# Patient Record
Sex: MALE | Race: WHITE | Employment: OTHER | URBAN - METROPOLITAN AREA
[De-identification: names, ages, dates, MRNs, and addresses within clinical notes are randomized per-mention and may not be internally consistent; named-entity substitution may affect disease eponyms.]

---

## 2023-01-23 ENCOUNTER — HOSPITAL ENCOUNTER (OUTPATIENT)
Dept: PREADMISSION TESTING | Age: 68
Discharge: HOME | End: 2023-01-23
Payer: MEDICARE

## 2023-01-23 ENCOUNTER — TRANSCRIBE ORDER (OUTPATIENT)
Dept: REGISTRATION | Age: 68
End: 2023-01-23

## 2023-01-23 DIAGNOSIS — M16.11 PRIMARY OSTEOARTHRITIS OF RIGHT HIP: Primary | ICD-10-CM

## 2023-01-23 DIAGNOSIS — M16.11 PRIMARY OSTEOARTHRITIS OF RIGHT HIP: ICD-10-CM

## 2023-01-23 LAB
APPEARANCE UR: CLEAR
APTT PPP: 26.3 SEC (ref 23–36.4)
ATRIAL RATE: 61 BPM
BILIRUB UR QL: NEGATIVE
CALCULATED P AXIS, ECG09: 54 DEGREES
CALCULATED R AXIS, ECG10: 82 DEGREES
CALCULATED T AXIS, ECG11: 26 DEGREES
COLOR UR: YELLOW
DIAGNOSIS, 93000: NORMAL
GLUCOSE UR STRIP.AUTO-MCNC: NEGATIVE MG/DL
HGB UR QL STRIP: NEGATIVE
INR PPP: 1 (ref 0.8–1.2)
KETONES UR QL STRIP.AUTO: NEGATIVE MG/DL
LEUKOCYTE ESTERASE UR QL STRIP.AUTO: NEGATIVE
NITRITE UR QL STRIP.AUTO: NEGATIVE
P-R INTERVAL, ECG05: 166 MS
PH UR STRIP: 5.5 (ref 5–8)
PROT UR STRIP-MCNC: NEGATIVE MG/DL
PROTHROMBIN TIME: 13.7 SEC (ref 11.5–15.2)
Q-T INTERVAL, ECG07: 380 MS
QRS DURATION, ECG06: 84 MS
QTC CALCULATION (BEZET), ECG08: 382 MS
SP GR UR REFRACTOMETRY: 1.01 (ref 1–1.03)
UROBILINOGEN UR QL STRIP.AUTO: 0.2 EU/DL (ref 0.2–1)
VENTRICULAR RATE, ECG03: 61 BPM

## 2023-01-23 PROCEDURE — 93005 ELECTROCARDIOGRAM TRACING: CPT

## 2023-01-23 PROCEDURE — 36415 COLL VENOUS BLD VENIPUNCTURE: CPT

## 2023-01-23 PROCEDURE — 85610 PROTHROMBIN TIME: CPT

## 2023-01-23 PROCEDURE — 87086 URINE CULTURE/COLONY COUNT: CPT

## 2023-01-23 PROCEDURE — 85730 THROMBOPLASTIN TIME PARTIAL: CPT

## 2023-01-23 PROCEDURE — 81003 URINALYSIS AUTO W/O SCOPE: CPT

## 2023-01-24 LAB
BACTERIA SPEC CULT: NORMAL
SERVICE CMNT-IMP: NORMAL
SERVICE CMNT-IMP: NORMAL

## 2023-02-02 ENCOUNTER — HOSPITAL ENCOUNTER (OUTPATIENT)
Dept: PREADMISSION TESTING | Age: 68
Discharge: HOME | End: 2023-02-02

## 2023-02-02 VITALS — WEIGHT: 250 LBS | BODY MASS INDEX: 40.18 KG/M2 | HEIGHT: 66 IN

## 2023-02-02 RX ORDER — LANOLIN ALCOHOL/MO/W.PET/CERES
2000 CREAM (GRAM) TOPICAL EVERY OTHER DAY
COMMUNITY

## 2023-02-02 RX ORDER — AMLODIPINE BESYLATE 10 MG/1
10 TABLET ORAL
COMMUNITY

## 2023-02-02 RX ORDER — LOPERAMIDE HYDROCHLORIDE 2 MG/1
2 CAPSULE ORAL
COMMUNITY

## 2023-02-02 RX ORDER — VALACYCLOVIR HYDROCHLORIDE 500 MG/1
500 TABLET, FILM COATED ORAL
COMMUNITY

## 2023-02-02 RX ORDER — ASCORBIC ACID 500 MG
1000 TABLET ORAL DAILY
COMMUNITY

## 2023-02-02 RX ORDER — LOSARTAN POTASSIUM 100 MG/1
100 TABLET ORAL
COMMUNITY

## 2023-02-02 RX ORDER — DORZOLAMIDE HYDROCHLORIDE AND TIMOLOL MALEATE PRESERVATIVE FREE 20; 5 MG/ML; MG/ML
1 SOLUTION/ DROPS OPHTHALMIC 2 TIMES DAILY
COMMUNITY

## 2023-02-02 RX ORDER — SODIUM CHLORIDE, SODIUM LACTATE, POTASSIUM CHLORIDE, CALCIUM CHLORIDE 600; 310; 30; 20 MG/100ML; MG/100ML; MG/100ML; MG/100ML
125 INJECTION, SOLUTION INTRAVENOUS CONTINUOUS
Status: CANCELLED | OUTPATIENT
Start: 2023-02-02

## 2023-02-02 RX ORDER — CEFAZOLIN SODIUM/WATER 2 G/20 ML
2 SYRINGE (ML) INTRAVENOUS ONCE
Status: CANCELLED | OUTPATIENT
Start: 2023-02-02 | End: 2023-02-02

## 2023-02-02 RX ORDER — CHOLECALCIFEROL TAB 125 MCG (5000 UNIT) 125 MCG
5000 TAB ORAL DAILY
COMMUNITY

## 2023-02-02 RX ORDER — MULTIVITAMIN
1 TABLET ORAL DAILY
COMMUNITY

## 2023-02-02 RX ORDER — METOPROLOL SUCCINATE 25 MG/1
25 TABLET, EXTENDED RELEASE ORAL
COMMUNITY

## 2023-02-02 NOTE — PERIOP NOTES
Operative consent filled out according to MD order on surgical posting, verbatim. Patient states \"no implanted devices\"   Patient instructed as part of pre-op teaching not to bring any valuables including Wallet, jewelry, cell phone, lap top or tablet. Patient also instructed not to wear anything on skin including deodorant, cologne, creams or sprays. Patient confirmed receipt of CHG skin preparation and instructions.

## 2023-02-09 ENCOUNTER — ANESTHESIA EVENT (OUTPATIENT)
Facility: HOSPITAL | Age: 68
End: 2023-02-09
Payer: MEDICARE

## 2023-02-09 RX ORDER — M-VIT,TX,IRON,MINS/CALC/FOLIC 27MG-0.4MG
1 TABLET ORAL DAILY
COMMUNITY

## 2023-02-09 RX ORDER — AMLODIPINE BESYLATE 10 MG/1
10 TABLET ORAL DAILY
COMMUNITY

## 2023-02-09 RX ORDER — CHLORHEXIDINE GLUCONATE 4 G/100ML
SOLUTION TOPICAL DAILY PRN
Status: CANCELLED | OUTPATIENT
Start: 2023-02-13

## 2023-02-09 RX ORDER — LANOLIN ALCOHOL/MO/W.PET/CERES
2000 CREAM (GRAM) TOPICAL EVERY OTHER DAY
COMMUNITY

## 2023-02-09 RX ORDER — LOPERAMIDE HYDROCHLORIDE 2 MG/1
2 CAPSULE ORAL 4 TIMES DAILY PRN
COMMUNITY

## 2023-02-09 RX ORDER — ASCORBIC ACID 500 MG
1000 TABLET ORAL DAILY
COMMUNITY

## 2023-02-09 RX ORDER — VALACYCLOVIR HYDROCHLORIDE 500 MG/1
500 TABLET, FILM COATED ORAL AS NEEDED
Status: ON HOLD | COMMUNITY
End: 2023-02-13 | Stop reason: HOSPADM

## 2023-02-09 RX ORDER — METOPROLOL SUCCINATE 25 MG/1
25 TABLET, EXTENDED RELEASE ORAL DAILY
COMMUNITY

## 2023-02-09 RX ORDER — LOSARTAN POTASSIUM 100 MG/1
100 TABLET ORAL NIGHTLY
COMMUNITY

## 2023-02-09 RX ORDER — DORZOLAMIDE HYDROCHLORIDE AND TIMOLOL MALEATE 20; 5 MG/ML; MG/ML
1 SOLUTION/ DROPS OPHTHALMIC 2 TIMES DAILY
COMMUNITY

## 2023-02-09 NOTE — PERIOP NOTE
Entered Epic to transfer ht/wt, allergies, home medications, medical history and orders from PAT phone appointment completed on 2/9/2023in [facility].

## 2023-02-13 ENCOUNTER — HOSPITAL ENCOUNTER (OUTPATIENT)
Facility: HOSPITAL | Age: 68
Setting detail: OUTPATIENT SURGERY
Discharge: HOME | End: 2023-02-13
Attending: ORTHOPAEDIC SURGERY | Admitting: ORTHOPAEDIC SURGERY
Payer: MEDICARE

## 2023-02-13 ENCOUNTER — ANESTHESIA (OUTPATIENT)
Facility: HOSPITAL | Age: 68
End: 2023-02-13
Payer: MEDICARE

## 2023-02-13 ENCOUNTER — APPOINTMENT (OUTPATIENT)
Facility: HOSPITAL | Age: 68
End: 2023-02-13
Attending: ORTHOPAEDIC SURGERY
Payer: MEDICARE

## 2023-02-13 VITALS
TEMPERATURE: 97 F | HEIGHT: 66 IN | SYSTOLIC BLOOD PRESSURE: 121 MMHG | HEART RATE: 67 BPM | WEIGHT: 250 LBS | RESPIRATION RATE: 12 BRPM | BODY MASS INDEX: 40.18 KG/M2 | DIASTOLIC BLOOD PRESSURE: 62 MMHG | OXYGEN SATURATION: 99 %

## 2023-02-13 DIAGNOSIS — Z96.641 S/P HIP REPLACEMENT, RIGHT: Primary | ICD-10-CM

## 2023-02-13 LAB
ABO + RH BLD: NORMAL
BLOOD GROUP ANTIBODIES SERPL: NORMAL
GLUCOSE BLD STRIP.AUTO-MCNC: 121 MG/DL (ref 70–110)
GLUCOSE BLD STRIP.AUTO-MCNC: 185 MG/DL (ref 70–110)
SPECIMEN EXP DATE BLD: NORMAL

## 2023-02-13 PROCEDURE — 2709999900 HC NON-CHARGEABLE SUPPLY: Performed by: ORTHOPAEDIC SURGERY

## 2023-02-13 PROCEDURE — 86901 BLOOD TYPING SEROLOGIC RH(D): CPT

## 2023-02-13 PROCEDURE — 6370000000 HC RX 637 (ALT 250 FOR IP): Performed by: ORTHOPAEDIC SURGERY

## 2023-02-13 PROCEDURE — 7100000001 HC PACU RECOVERY - ADDTL 15 MIN: Performed by: ORTHOPAEDIC SURGERY

## 2023-02-13 PROCEDURE — 6360000002 HC RX W HCPCS: Performed by: NURSE ANESTHETIST, CERTIFIED REGISTERED

## 2023-02-13 PROCEDURE — 6370000000 HC RX 637 (ALT 250 FOR IP): Performed by: ANESTHESIOLOGY

## 2023-02-13 PROCEDURE — 97161 PT EVAL LOW COMPLEX 20 MIN: CPT

## 2023-02-13 PROCEDURE — 3600000005 HC SURGERY LEVEL 5 BASE: Performed by: ORTHOPAEDIC SURGERY

## 2023-02-13 PROCEDURE — 6360000002 HC RX W HCPCS: Performed by: ORTHOPAEDIC SURGERY

## 2023-02-13 PROCEDURE — 97116 GAIT TRAINING THERAPY: CPT

## 2023-02-13 PROCEDURE — 3700000001 HC ADD 15 MINUTES (ANESTHESIA): Performed by: ORTHOPAEDIC SURGERY

## 2023-02-13 PROCEDURE — 2720000010 HC SURG SUPPLY STERILE: Performed by: ORTHOPAEDIC SURGERY

## 2023-02-13 PROCEDURE — 2500000003 HC RX 250 WO HCPCS: Performed by: PHYSICIAN ASSISTANT

## 2023-02-13 PROCEDURE — 7100000011 HC PHASE II RECOVERY - ADDTL 15 MIN: Performed by: ORTHOPAEDIC SURGERY

## 2023-02-13 PROCEDURE — 2500000003 HC RX 250 WO HCPCS: Performed by: ORTHOPAEDIC SURGERY

## 2023-02-13 PROCEDURE — 7100000000 HC PACU RECOVERY - FIRST 15 MIN: Performed by: ORTHOPAEDIC SURGERY

## 2023-02-13 PROCEDURE — 3700000000 HC ANESTHESIA ATTENDED CARE: Performed by: ORTHOPAEDIC SURGERY

## 2023-02-13 PROCEDURE — 3600000015 HC SURGERY LEVEL 5 ADDTL 15MIN: Performed by: ORTHOPAEDIC SURGERY

## 2023-02-13 PROCEDURE — 2580000003 HC RX 258: Performed by: ORTHOPAEDIC SURGERY

## 2023-02-13 PROCEDURE — 7100000010 HC PHASE II RECOVERY - FIRST 15 MIN: Performed by: ORTHOPAEDIC SURGERY

## 2023-02-13 PROCEDURE — 82962 GLUCOSE BLOOD TEST: CPT

## 2023-02-13 PROCEDURE — 6360000002 HC RX W HCPCS: Performed by: ANESTHESIOLOGY

## 2023-02-13 PROCEDURE — C1776 JOINT DEVICE (IMPLANTABLE): HCPCS | Performed by: ORTHOPAEDIC SURGERY

## 2023-02-13 PROCEDURE — 73501 X-RAY EXAM HIP UNI 1 VIEW: CPT

## 2023-02-13 PROCEDURE — 97535 SELF CARE MNGMENT TRAINING: CPT

## 2023-02-13 PROCEDURE — 97165 OT EVAL LOW COMPLEX 30 MIN: CPT

## 2023-02-13 PROCEDURE — 36415 COLL VENOUS BLD VENIPUNCTURE: CPT

## 2023-02-13 PROCEDURE — 2500000003 HC RX 250 WO HCPCS: Performed by: NURSE ANESTHETIST, CERTIFIED REGISTERED

## 2023-02-13 DEVICE — TRIDENT II TRITANIUM CLUSTER 58F
Type: IMPLANTABLE DEVICE | Site: HIP | Status: FUNCTIONAL
Brand: TRIDENT II

## 2023-02-13 DEVICE — CERAMIC V40 FEMORAL HEAD
Type: IMPLANTABLE DEVICE | Site: HIP | Status: FUNCTIONAL
Brand: BIOLOX

## 2023-02-13 DEVICE — TRIDENT X3 0 DEGREE POLYETHYLENE INSERT
Type: IMPLANTABLE DEVICE | Site: HIP | Status: FUNCTIONAL
Brand: TRIDENT X3 INSERT

## 2023-02-13 DEVICE — COMPONENT TOT HIP CAPPED LNR POLYETH H2STRYKER] STRYKER CORP]: Type: IMPLANTABLE DEVICE | Site: HIP | Status: FUNCTIONAL

## 2023-02-13 DEVICE — HIP STEM - HIGH OFFSET
Type: IMPLANTABLE DEVICE | Site: HIP | Status: FUNCTIONAL
Brand: INSIGNIA

## 2023-02-13 DEVICE — SCREW BNE L25MM DIA6.5MM HEX LO PROF TRIDENT II: Type: IMPLANTABLE DEVICE | Site: HIP | Status: FUNCTIONAL

## 2023-02-13 RX ORDER — HYDRALAZINE HYDROCHLORIDE 20 MG/ML
10 INJECTION INTRAMUSCULAR; INTRAVENOUS
Status: DISCONTINUED | OUTPATIENT
Start: 2023-02-13 | End: 2023-02-13 | Stop reason: HOSPADM

## 2023-02-13 RX ORDER — ROCURONIUM BROMIDE 10 MG/ML
INJECTION, SOLUTION INTRAVENOUS PRN
Status: DISCONTINUED | OUTPATIENT
Start: 2023-02-13 | End: 2023-02-13 | Stop reason: SDUPTHER

## 2023-02-13 RX ORDER — SODIUM CHLORIDE 9 MG/ML
INJECTION, SOLUTION INTRAVENOUS PRN
Status: DISCONTINUED | OUTPATIENT
Start: 2023-02-13 | End: 2023-02-13 | Stop reason: HOSPADM

## 2023-02-13 RX ORDER — SODIUM CHLORIDE 0.9 % (FLUSH) 0.9 %
5-40 SYRINGE (ML) INJECTION EVERY 12 HOURS SCHEDULED
Status: DISCONTINUED | OUTPATIENT
Start: 2023-02-13 | End: 2023-02-13 | Stop reason: HOSPADM

## 2023-02-13 RX ORDER — MIDAZOLAM HYDROCHLORIDE 2 MG/2ML
2 INJECTION, SOLUTION INTRAMUSCULAR; INTRAVENOUS
Status: DISCONTINUED | OUTPATIENT
Start: 2023-02-13 | End: 2023-02-13 | Stop reason: HOSPADM

## 2023-02-13 RX ORDER — FENTANYL CITRATE 50 UG/ML
25 INJECTION, SOLUTION INTRAMUSCULAR; INTRAVENOUS EVERY 5 MIN PRN
Status: DISCONTINUED | OUTPATIENT
Start: 2023-02-13 | End: 2023-02-13 | Stop reason: HOSPADM

## 2023-02-13 RX ORDER — ASPIRIN 81 MG/1
81 TABLET ORAL 2 TIMES DAILY
Qty: 60 TABLET | Refills: 0 | Status: SHIPPED | OUTPATIENT
Start: 2023-02-13

## 2023-02-13 RX ORDER — OXYCODONE HYDROCHLORIDE AND ACETAMINOPHEN 5; 325 MG/1; MG/1
1 TABLET ORAL EVERY 6 HOURS PRN
Qty: 40 TABLET | Refills: 0 | OUTPATIENT
Start: 2023-02-13 | End: 2023-02-13 | Stop reason: SDUPTHER

## 2023-02-13 RX ORDER — ONDANSETRON 2 MG/ML
INJECTION INTRAMUSCULAR; INTRAVENOUS PRN
Status: DISCONTINUED | OUTPATIENT
Start: 2023-02-13 | End: 2023-02-13 | Stop reason: SDUPTHER

## 2023-02-13 RX ORDER — DIPHENHYDRAMINE HYDROCHLORIDE 50 MG/ML
12.5 INJECTION INTRAMUSCULAR; INTRAVENOUS
Status: DISCONTINUED | OUTPATIENT
Start: 2023-02-13 | End: 2023-02-13 | Stop reason: HOSPADM

## 2023-02-13 RX ORDER — CEPHALEXIN 500 MG/1
500 CAPSULE ORAL 4 TIMES DAILY
Qty: 8 CAPSULE | Refills: 0 | Status: SHIPPED | OUTPATIENT
Start: 2023-02-13 | End: 2023-02-15

## 2023-02-13 RX ORDER — LIDOCAINE HYDROCHLORIDE 20 MG/ML
INJECTION, SOLUTION EPIDURAL; INFILTRATION; INTRACAUDAL; PERINEURAL PRN
Status: DISCONTINUED | OUTPATIENT
Start: 2023-02-13 | End: 2023-02-13 | Stop reason: SDUPTHER

## 2023-02-13 RX ORDER — ACETAMINOPHEN 500 MG
1000 TABLET ORAL ONCE
Status: COMPLETED | OUTPATIENT
Start: 2023-02-13 | End: 2023-02-13

## 2023-02-13 RX ORDER — MEPERIDINE HYDROCHLORIDE 50 MG/ML
12.5 INJECTION INTRAMUSCULAR; INTRAVENOUS; SUBCUTANEOUS EVERY 5 MIN PRN
Status: DISCONTINUED | OUTPATIENT
Start: 2023-02-13 | End: 2023-02-13 | Stop reason: HOSPADM

## 2023-02-13 RX ORDER — OXYCODONE HYDROCHLORIDE 5 MG/1
5 TABLET ORAL EVERY 4 HOURS PRN
Status: DISCONTINUED | OUTPATIENT
Start: 2023-02-13 | End: 2023-02-13 | Stop reason: HOSPADM

## 2023-02-13 RX ORDER — LABETALOL HYDROCHLORIDE 5 MG/ML
10 INJECTION, SOLUTION INTRAVENOUS EVERY 10 MIN PRN
Status: DISCONTINUED | OUTPATIENT
Start: 2023-02-13 | End: 2023-02-13 | Stop reason: HOSPADM

## 2023-02-13 RX ORDER — TRANEXAMIC ACID 10 MG/ML
1000 INJECTION, SOLUTION INTRAVENOUS
Status: COMPLETED | OUTPATIENT
Start: 2023-02-13 | End: 2023-02-13

## 2023-02-13 RX ORDER — CELECOXIB 100 MG/1
200 CAPSULE ORAL ONCE
Status: COMPLETED | OUTPATIENT
Start: 2023-02-13 | End: 2023-02-13

## 2023-02-13 RX ORDER — HYDROMORPHONE HYDROCHLORIDE 1 MG/ML
0.5 INJECTION, SOLUTION INTRAMUSCULAR; INTRAVENOUS; SUBCUTANEOUS EVERY 5 MIN PRN
Status: DISCONTINUED | OUTPATIENT
Start: 2023-02-13 | End: 2023-02-13 | Stop reason: HOSPADM

## 2023-02-13 RX ORDER — SODIUM CHLORIDE, SODIUM LACTATE, POTASSIUM CHLORIDE, CALCIUM CHLORIDE 600; 310; 30; 20 MG/100ML; MG/100ML; MG/100ML; MG/100ML
INJECTION, SOLUTION INTRAVENOUS CONTINUOUS
Status: DISCONTINUED | OUTPATIENT
Start: 2023-02-13 | End: 2023-02-13 | Stop reason: HOSPADM

## 2023-02-13 RX ORDER — EPHEDRINE SULFATE/0.9% NACL/PF 50 MG/5 ML
SYRINGE (ML) INTRAVENOUS PRN
Status: DISCONTINUED | OUTPATIENT
Start: 2023-02-13 | End: 2023-02-13 | Stop reason: SDUPTHER

## 2023-02-13 RX ORDER — MIDAZOLAM HYDROCHLORIDE 1 MG/ML
INJECTION INTRAMUSCULAR; INTRAVENOUS PRN
Status: DISCONTINUED | OUTPATIENT
Start: 2023-02-13 | End: 2023-02-13 | Stop reason: SDUPTHER

## 2023-02-13 RX ORDER — INSULIN LISPRO 100 [IU]/ML
0-16 INJECTION, SOLUTION INTRAVENOUS; SUBCUTANEOUS
Status: DISCONTINUED | OUTPATIENT
Start: 2023-02-13 | End: 2023-02-13 | Stop reason: HOSPADM

## 2023-02-13 RX ORDER — KETAMINE HCL IN NACL, ISO-OSM 100MG/10ML
SYRINGE (ML) INJECTION PRN
Status: DISCONTINUED | OUTPATIENT
Start: 2023-02-13 | End: 2023-02-13 | Stop reason: SDUPTHER

## 2023-02-13 RX ORDER — SODIUM CHLORIDE 0.9 % (FLUSH) 0.9 %
5-40 SYRINGE (ML) INJECTION PRN
Status: DISCONTINUED | OUTPATIENT
Start: 2023-02-13 | End: 2023-02-13 | Stop reason: HOSPADM

## 2023-02-13 RX ORDER — ONDANSETRON 2 MG/ML
4 INJECTION INTRAMUSCULAR; INTRAVENOUS
Status: DISCONTINUED | OUTPATIENT
Start: 2023-02-13 | End: 2023-02-13 | Stop reason: HOSPADM

## 2023-02-13 RX ORDER — OXYCODONE HYDROCHLORIDE AND ACETAMINOPHEN 5; 325 MG/1; MG/1
1 TABLET ORAL EVERY 6 HOURS PRN
Qty: 40 TABLET | Refills: 0 | Status: SHIPPED | OUTPATIENT
Start: 2023-02-13 | End: 2023-02-20

## 2023-02-13 RX ORDER — PROPOFOL 10 MG/ML
INJECTION, EMULSION INTRAVENOUS PRN
Status: DISCONTINUED | OUTPATIENT
Start: 2023-02-13 | End: 2023-02-13 | Stop reason: SDUPTHER

## 2023-02-13 RX ADMIN — HYDROMORPHONE HYDROCHLORIDE 0.5 MG: 1 INJECTION, SOLUTION INTRAMUSCULAR; INTRAVENOUS; SUBCUTANEOUS at 11:20

## 2023-02-13 RX ADMIN — TRANEXAMIC ACID 1000 MG: 10 INJECTION, SOLUTION INTRAVENOUS at 10:28

## 2023-02-13 RX ADMIN — SODIUM CHLORIDE, SODIUM LACTATE, POTASSIUM CHLORIDE, AND CALCIUM CHLORIDE: 600; 310; 30; 20 INJECTION, SOLUTION INTRAVENOUS at 06:31

## 2023-02-13 RX ADMIN — Medication 20 MG: at 07:50

## 2023-02-13 RX ADMIN — ROCURONIUM BROMIDE 10 MG: 10 INJECTION, SOLUTION INTRAVENOUS at 09:28

## 2023-02-13 RX ADMIN — TRANEXAMIC ACID 1000 MG: 10 INJECTION, SOLUTION INTRAVENOUS at 08:08

## 2023-02-13 RX ADMIN — ROCURONIUM BROMIDE 5 MG: 10 INJECTION, SOLUTION INTRAVENOUS at 09:50

## 2023-02-13 RX ADMIN — HYDROMORPHONE HYDROCHLORIDE 0.5 MG: 1 INJECTION, SOLUTION INTRAMUSCULAR; INTRAVENOUS; SUBCUTANEOUS at 11:13

## 2023-02-13 RX ADMIN — ACETAMINOPHEN 1000 MG: 500 TABLET ORAL at 06:30

## 2023-02-13 RX ADMIN — ONDANSETRON HYDROCHLORIDE 4 MG: 2 INJECTION INTRAMUSCULAR; INTRAVENOUS at 10:28

## 2023-02-13 RX ADMIN — MIDAZOLAM 2 MG: 1 INJECTION INTRAMUSCULAR; INTRAVENOUS at 07:41

## 2023-02-13 RX ADMIN — Medication 10 MG: at 08:44

## 2023-02-13 RX ADMIN — Medication 10 MG: at 08:47

## 2023-02-13 RX ADMIN — CELECOXIB 200 MG: 100 CAPSULE ORAL at 06:30

## 2023-02-13 RX ADMIN — ROCURONIUM BROMIDE 10 MG: 10 INJECTION, SOLUTION INTRAVENOUS at 09:38

## 2023-02-13 RX ADMIN — LIDOCAINE HYDROCHLORIDE 80 MG: 20 INJECTION, SOLUTION EPIDURAL; INFILTRATION; INTRACAUDAL; PERINEURAL at 07:45

## 2023-02-13 RX ADMIN — HYDROMORPHONE HYDROCHLORIDE 0.25 MG: 1 INJECTION, SOLUTION INTRAMUSCULAR; INTRAVENOUS; SUBCUTANEOUS at 07:52

## 2023-02-13 RX ADMIN — Medication 2000 MG: at 07:49

## 2023-02-13 RX ADMIN — OXYCODONE HYDROCHLORIDE 5 MG: 5 TABLET ORAL at 13:46

## 2023-02-13 RX ADMIN — HYDROMORPHONE HYDROCHLORIDE 0.25 MG: 1 INJECTION, SOLUTION INTRAMUSCULAR; INTRAVENOUS; SUBCUTANEOUS at 08:14

## 2023-02-13 RX ADMIN — HYDROMORPHONE HYDROCHLORIDE 0.5 MG: 1 INJECTION, SOLUTION INTRAMUSCULAR; INTRAVENOUS; SUBCUTANEOUS at 08:21

## 2023-02-13 RX ADMIN — FENTANYL CITRATE 25 MCG: 50 INJECTION, SOLUTION INTRAMUSCULAR; INTRAVENOUS at 11:35

## 2023-02-13 RX ADMIN — PROPOFOL 150 MG: 10 INJECTION, EMULSION INTRAVENOUS at 07:45

## 2023-02-13 RX ADMIN — SODIUM CHLORIDE, SODIUM LACTATE, POTASSIUM CHLORIDE, AND CALCIUM CHLORIDE: 600; 310; 30; 20 INJECTION, SOLUTION INTRAVENOUS at 10:05

## 2023-02-13 ASSESSMENT — PAIN DESCRIPTION - LOCATION
LOCATION: HIP

## 2023-02-13 ASSESSMENT — PAIN SCALES - GENERAL
PAINLEVEL_OUTOF10: 0
PAINLEVEL_OUTOF10: 8
PAINLEVEL_OUTOF10: 6
PAINLEVEL_OUTOF10: 7
PAINLEVEL_OUTOF10: 4

## 2023-02-13 ASSESSMENT — PAIN DESCRIPTION - ORIENTATION
ORIENTATION: RIGHT

## 2023-02-13 ASSESSMENT — PAIN DESCRIPTION - DESCRIPTORS
DESCRIPTORS: DISCOMFORT;ACHING;BURNING
DESCRIPTORS: ACHING

## 2023-02-13 ASSESSMENT — PAIN - FUNCTIONAL ASSESSMENT
PAIN_FUNCTIONAL_ASSESSMENT: INTOLERABLE, UNABLE TO DO ANY ACTIVE OR PASSIVE ACTIVITIES
PAIN_FUNCTIONAL_ASSESSMENT: NONE - DENIES PAIN

## 2023-02-13 NOTE — ANESTHESIA POSTPROCEDURE EVALUATION
Department of Anesthesiology  Postprocedure Note    Patient: Lashawn Siegel   MRN: [id number]  YOB: 1955  Date of evaluation: 2/13/2023      Procedure Summary     Date: 02/13/23 Room / Location: [facility] 05 / [facility] MAIN OR    Anesthesia Start: 9326 Anesthesia Stop: 3354    Procedure: RIGHT TOTAL HIP REPLACEMENT ANTERIOR APPROACH WITH C-ARM (Right: Hip) Diagnosis:       Primary osteoarthritis of right hip      (RIGHT HIP OSTEOARTHRITIS)    Surgeons: Piper Lopez MD Responsible Provider: Angelica Almonte MD    Anesthesia Type: General ASA Status: 3          Anesthesia Type: General    Ro Phase I: Ro Score: 10    Ro Phase II:        Anesthesia Post Evaluation    Patient location during evaluation: PACU  Patient participation: complete - patient participated  Level of consciousness: awake  Pain score: 2  Airway patency: patent  Nausea & Vomiting: no nausea and no vomiting  Complications: no  Cardiovascular status: blood pressure returned to baseline  Respiratory status: acceptable  Hydration status: euvolemic

## 2023-02-13 NOTE — DISCHARGE INSTRUCTIONS
Total Hip Arthroplasty Discharge Instructions   Bro Lindsay M.D. Please take the time to review the following instructions before you leave the hospital and use them as guidelines during your recovery from surgery. If you have any questions you may contact my office at [phone]. Wound Care / Dressing Changes:     Two days after your surgery date you should remove your dressing. A big, bulky dressing isn't necessary as long as there isn't any drainage from the incisions. If there is drainage you can put a band-aid, Primapore, or Mepilex dressing over the incision and change it daily until drainage stops. No dressing is necessary if there is no drainage. It isn't necessary to apply antibiotic ointment to your incisions. Exofin tape will peel off in approximately 2-6 weeks. It does not need to be removed prior to that. When it begins to peel off you can cut the edges away with scissors. Mertha Hagan / Bathing: You may only shower. You may shower if there is no drainage from your incisions. Your dressing may be removed for showering. You may get your incisions wet in the shower. Don't vigorously scrub the area where your incisions are. Apply a clean, dry dressing after drying off the area of your incisions. Don't take a tub bath, get in a swimming pool or Jacuzzi until the incisions are completely healed. Do not soak your incisions under water. Weight Bearing Status / Braces:     Weight bearing is as tolerated. Use crutches, walker, or cane as needed for support. You may move your joints as much as tolerated. Home Health:    Home health has been arranged for skilled nursing visits and physical therapy. Your home health has been set up through____________ . If no one from the agency calls you on the day after you arrive home, please contact them at the number provided at discharge. PT, gait training and strengthening. Continue therapeutic exercises.      Physical Therapy:       Begin In-Home Physical Therapy; 3 times a week to work on gait training, range of motion, strengthening, and weight bearing exercises as tolerable. Continue to use your walker or cane when walking; may progress from the walker to a cane to complete total bearing as tolerable. Ice / Elevation:     Continue ice and elevation as needed for pain and swelling. Diet:     Resume your prehospital diet. If you have excessive nausea or vomiting call your doctor's office     Medication:   {Medication reconciliation information is now added to the patient's AVS automatically when it is printed. There is no need to use this SmartLink in discharge instructions. Highlight this text and delete it to clear this message}      1. You will be given a prescription for pain medication when you are discharged for the hospital. Take the medication as needed according to the directions on the prescription bottle. Possible side effects ofthe medication include dizziness, headache, nausea, vomiting, constipation and urinary retention. If you experience any ofthese side effects call the office so that we can assist you in relieving them. Discontinue the use ofthe pain medication if you develop itching, rash, shortness ofbreath or difficulties swallowing. If these symptoms become severe or aren't relieved by discontinuing the medication you should seek immediate medical attention. Refills of pain medication are authorized during office hours only. (8am - 5pm Mon. thru Fri.)     You may resume the medication you were taking prior to your surgery. Pain medication may change the effects of any antidepressant medication you are taking. Ifyou have any questions about possible interactions between your regular medications and the pain medication you should consult the physician who prescribes your regular medications. Other medication notes:      Blood Thinner:       You will be prescribed Aspirin 81 mg to take by mouth twice daily after meals for one month following surgery to prevent blood clots. Follow Up Appointment:   Please call [phone] for a follow appointment with Dr. Ana Martinez in 10-14 days from the time of your surgery. Please let our office know you are scheduling a post-op appointment. Signs and Symptoms to be Aware of: If any of the following signs and symptoms occur, you should contact Dr. Amarilys Kowalski office. Please be advised if a problem arises which you feel requires immediate medical attention or you are unable to contact Dr. Amarilys Kowalski office you should seek immediate medical attention at the emergency department or other health care facility you have access to. Signs and symptoms to watch for include:     1. A sudden increase in swelling and l or redness or warmth at the area your surgery was performed which isn't relieved by rest, ice and elevation. 2 Oral temperature greater than 101.5 degrees for 12 hours or more which isn't relieved by an increase in fluid intake and taking two Tylenol every 4-6 hours. 3 Excessive drainage from your incisions, or drainage which hasn't stopped by 72 hours after your surgery despite applying a compressive dressing, ice and elevation. 4 Calfpain, tenderness, redness or swelling which isn't relieved with rest and elevation. 5 Fever, chills, shortness ofbreath, chest pain, nausea, vomiting or other signs and symptoms which are of concern to you. Other Instructions:       DISCHARGE SUMMARY from Nurse    PATIENT INSTRUCTIONS:    After general anesthesia or intravenous sedation, for 24 hours or while taking prescription Narcotics:  Limit your activities  Do not drive and operate hazardous machinery  Do not make important personal or business decisions  Do  not drink alcoholic beverages  If you have not urinated within 8 hours after discharge, please contact your surgeon on call.     Report the following to your surgeon:  Excessive pain, swelling, redness or odor of or around the surgical area  Temperature over 100.5  Nausea and vomiting lasting longer than 4 hours or if unable to take medications  Any signs of decreased circulation or nerve impairment to extremity: change in color, persistent  numbness, tingling, coldness or increase pain  Any questions    What to do at Home:  Recommended activity: activity as tolerated. If you experience any of the following symptoms as stated above, please follow up with Dr. Julia Guevara. *  Please give a list of your current medications to your Primary Care Provider. *  Please update this list whenever your medications are discontinued, doses are      changed, or new medications (including over-the-counter products) are added. *  Please carry medication information at all times in case of emergency situations. These are general instructions for a healthy lifestyle:    No smoking/ No tobacco products/ Avoid exposure to second hand smoke  Surgeon General's Warning:  Quitting smoking now greatly reduces serious risk to your health. Obesity, smoking, and sedentary lifestyle greatly increases your risk for illness    A healthy diet, regular physical exercise & weight monitoring are important for maintaining a healthy lifestyle    You may be retaining fluid if you have a history of heart failure or if you experience any of the following symptoms:  Weight gain of 3 pounds or more overnight or 5 pounds in a week, increased swelling in our hands or feet or shortness of breath while lying flat in bed. Please call your doctor as soon as you notice any of these symptoms; do not wait until your next office visit. The discharge information has been reviewed with the patient and caregiver. The patient and caregiver verbalized understanding. Discharge medications reviewed with the patient and caregiver and appropriate educational materials and side effects teaching were provided.     Patient armband removed and shredded ___________________________________________________________________________________________________________________________________

## 2023-02-13 NOTE — H&P
[facility]  History and Physical Exam    Patient: Christine Badillo MRN: [id number]  SSN: [id number]    YOB: 1955  Age: 80 y.o. Sex: male      Subjective:      Chief Complaint: right hip pain    History of Present Illness:  Patient complains of right hip pain and difficulty ambulating. Past Medical History:   Diagnosis Date    Arthritis     Chronic pain     right hip, left ankle    Diabetes mellitus type 2, diet-controlled ([facility] [address]     Hypertension     Morbid obesity ([facility] [address]     Sleep apnea     CPAP user     Past Surgical History:   Procedure Laterality Date    BREAST LUMPECTOMY Right 2015    GI  1992    removal anal polyp    HEENT Left 2003    Vitrectomy    HEENT      oral surgery     Social History     Occupational History    Not on file   Tobacco Use    Smoking status: Former    Smokeless tobacco: Never   Substance and Sexual Activity    Alcohol use: Not Currently    Drug use: Never    Sexual activity: Not on file     Prior to Admission medications    Medication Sig Start Date End Date Taking?  Authorizing Provider   amLODIPine (NORVASC) 10 MG tablet Take 10 mg by mouth daily   Yes Historical Provider, MD   vitamin C (ASCORBIC ACID) 500 MG tablet Take 1,000 mg by mouth daily   Yes Historical Provider, MD   vitamin D (CHOLECALCIFEROL) 25 MCG (1000 UT) TABS tablet Take 5,000 Units by mouth daily   Yes Historical Provider, MD   vitamin B-12 (CYANOCOBALAMIN) 1000 MCG tablet Take 2,000 mcg by mouth every other day   Yes Historical Provider, MD   DOCOSAHEXAENOIC ACID PO Take 1,360 mg by mouth daily   Yes Historical Provider, MD   loperamide (IMODIUM) 2 MG capsule Take 2 mg by mouth 4 times daily as needed for Diarrhea   Yes Historical Provider, MD   losartan (COZAAR) 100 MG tablet Take 100 mg by mouth nightly   Yes Historical Provider, MD   metoprolol succinate (TOPROL XL) 25 MG extended release tablet Take 25 mg by mouth daily   Yes Historical Provider, MD   Multiple Vitamins-Minerals (THERAPEUTIC MULTIVITAMIN-MINERALS) tablet Take 1 tablet by mouth daily   Yes Historical Provider, MD   valACYclovir (VALTREX) 500 MG tablet Take 500 mg by mouth as needed   Yes Historical Provider, MD   dorzolamide-timolol (COSOPT) 22.3-6.8 MG/ML ophthalmic solution Place 1 drop into both eyes 2 times daily   Yes Historical Provider, MD       Allergies: No Known Allergies     Family History: Arthritis, \"cancer\"    Review of Systems:  A comprehensive review of systems was negative except for that written in the History of Present Illness. Objective:       Physical Exam:  HEENT: Normocephalic, atraumatic  Lungs:  Clear to auscultation  Heart:   Regular rate and rhythm  Abdomen: Soft  Extremities:  Pain with range of motion of the right hip  Neurological: Grossly neurovascularly intact    Assessment:      Arthritis of the right hip. Plan:       The patient has failed previous efforts of conservative management to include non-steroidal anti-inflammatory medications and cortisone injections. Due to the fact that conservative efforts failed, the patient became a candidate for surgical intervention. Proceed with scheduled right total hip arthroplasty, anterior approach. The various methods of treatment have been discussed with the patient and family. After consideration of risks, benefits, and other options for treatment, the patient has consented to surgical interventions. Questions were answered and preoperative teaching was done by Dr. Tristan Samaniego.      Signed By: Lorraine Borjas PA-C     February 13, 2023

## 2023-02-13 NOTE — INTERVAL H&P NOTE
Update History & Physical    The patient's History and Physical of February 13, 2023 was reviewed with the patient and I examined the patient. There was no change. The surgical site was confirmed by the patient and me. Plan: The risks, benefits, expected outcome, and alternative to the recommended procedure have been discussed with the patient. Patient understands and wants to proceed with the procedure.      Electronically signed by Ren Mackenzie MD on 2/13/2023 at 7:33 AM

## 2023-02-13 NOTE — PERIOP NOTE
TRANSFER - OUT REPORT:    Verbal report given to JOHNATHAN Cnaales on Munira Vernon.  being transferred to Phase 2 for routine post-op       Report consisted of patient's Situation, Background, Assessment and   Recommendations(SBAR). Information from the following report(s) Nurse Handoff Report, Surgery Report, Intake/Output, and MAR was reviewed with the receiving nurse. [place] Assessment: No data recorded  Lines:   Peripheral IV 02/13/23 Left Forearm (Active)   Site Assessment Clean, dry & intact 02/13/23 1052   Phlebitis Assessment No symptoms 02/13/23 1052   Infiltration Assessment 0 02/13/23 1052   Dressing Status Clean, dry & intact 02/13/23 1052        Opportunity for questions and clarification was provided.       Patient transported with:  [employer]

## 2023-02-13 NOTE — PROGRESS NOTES
Occupational Therapy Goals:  Initiated 2/13/2023 to be met within 7-10 days. Short Term Goals  Time Frame for Short Term Goals: 7 days  Short Term Goal 1: Patient will perform lower body dressing with supervision and A/E PRN  Short Term Goal 2: Patient will perform toileting with supervision  Short Term Goal 3: Patient will perform grooming tasks standing at sink with supervision  Short Term Goal 4: Patient will perform bathroom mobility with supervision    OCCUPATIONAL THERAPY EVALUATION    Patient: Aminata Hernandez (79 y.o. male)  Date: 2/13/2023  Primary Diagnosis: Primary osteoarthritis of right hip [M16.11]  Procedure(s) (LRB):  RIGHT TOTAL HIP REPLACEMENT ANTERIOR APPROACH WITH C-ARM (Right) Day of Surgery   Precautions: Weight Bearing, Fall Risk, Right Lower Extremity Weight Bearing: Weight Bearing As Tolerated, Left Lower Extremity Weight Bearing: Weight Bearing As Tolerated,  ,  ,  ,  ,    PLOF: pt mod I for ADLs/functional mobility    ASSESSMENT :  Based on the objective data described below, the patient presents with RLE decreased ROM and strength affecting LE ADLs. Pt found seated in recliner chair, vitals assessed and WNL, pt reporting pain 4/10, agreeable to therapy. Educated pt on proper body mechanics for ADLs s/p THR. Pt completed upper body dressing with supervision. Pt able to thread B feet through underwear/pants without assist, and CGA when standing to pull up to waist. Pt required CGA/SBA for STS/bathroom mobility with vc for safe use of RW. Pt ambulated back to recliner, ice applied to R hip. Spouse present during session for education on home safety. Provided opportunity for pt to voice questions on ADL performance when home, pt has no further concerns. Patient will benefit from skilled Occupational Therapy intervention to maximize safety/independence with ADLs at d/c. Patient will benefit from skilled intervention to address the above impairments.   Patient's rehabilitation potential is considered to be fair. Factors which may influence rehabilitation potential include:   [x]             None noted  []             Mental ability/status  []             Medical condition  []             Home/family situation and support systems  []             Safety awareness  []             Pain tolerance/management  []             Other:      PLAN :    Frequency/Duration: Patient will be followed by occupational therapy to address goals, 1-2 times per day/3-5 days per week to address goals. Further Equipment Recommendations for Discharge: n/a    Discharge Recommendation: home with home health    AMPAC: Current research shows that an AM-PAC score of 18 or greater is associated with a discharge to the patient's home setting. Based on an AM-PAC score of 19/24 and their current ADL deficits; it is recommended that the patient have 2-3 sessions per week of Occupational Therapy at d/c to increase the patient's independence. This AMPAC score should be considered in conjunction with interdisciplinary team recommendations to determine the most appropriate discharge setting. Patient's social support, diagnosis, medical stability, and prior level of function should also be taken into consideration. SUBJECTIVE:   Patient stated I'm feeling pretty good.     OBJECTIVE DATA SUMMARY:     Past Medical History:   Diagnosis Date    Arthritis     Chronic pain     right hip, left ankle    Diabetes mellitus type 2, diet-controlled ([address]     Hypertension     Morbid obesity ([address]     Sleep apnea     CPAP user     Past Surgical History:   Procedure Laterality Date    BREAST LUMPECTOMY Right 2015    GI  1992    removal anal polyp    HEENT Left 2003    Vitrectomy    HEENT      oral surgery     Barriers to Learning/Limitations:  post-anesthesia  Compensate with: visual, verbal, tactile, kinesthetic cues/model    Home Situation:   Social/Functional History  Lives With: Spouse  Type of Home: House  Home Layout: One level  Home Access: Stairs to enter with rails  Entrance Stairs - Number of Steps: 6  Entrance Stairs - Rails: Right  Bathroom Shower/Tub: Walk-in shower  Bathroom Toilet: Standard  Bathroom Equipment: Grab bars in shower  ADL Assistance: Independent  []  Right hand dominant   []  Left hand dominant    Cognitive/Behavioral Status:  Orientation Level: Oriented X4  WNL    Coordination: BUE  Coordination: Within functional limits    Balance:  Balance  Sitting: Intact  Standing: With support    Strength: BUE  Strength: Generally decreased, functional    Tone & Sensation: BUE  Tone: Normal  Sensation: Impaired    Range of Motion: BUE  AROM: Generally decreased, functional  PROM: Generally decreased, functional    Functional Mobility and Transfers for ADLs:  Transfers:  Transfer Training  Transfer Training: Yes  Sit to Stand: Contact-guard assistance  Stand to Sit: Contact-guard assistance    ADL Assessment:   Feeding: Independent  Grooming: Stand by assistance  UE Bathing: Supervision  LE Bathing: Minimal assistance  UE Dressing: Supervision  LE Dressing: Minimal assistance  Toileting: Contact guard assistance    Pain:  Pain level pre-treatment: 4/10   Pain level post-treatment: 4/10   Pain Intervention(s): Rest, Ice, Repositioning   Response to intervention: Nurse notified    Activity Tolerance:   Fair. Pt able to stand ~5 minutes. Pt limited by pain, strength, ROM    Please refer to the flowsheet for vital signs taken during this treatment. After treatment:   [x] Patient left in no apparent distress sitting up in chair  [] Patient left in no apparent distress in bed  [] Call bell left within reach  [] Nursing notified  [x] Caregiver present  [] Bed alarm activated    COMMUNICATION/EDUCATION:   Patient Education  Education Given To: Patient; Family  Education Provided: Role of Therapy;Plan of Care;Energy Conservation; Fall Prevention Strategies;IADL Safety;Home Exercise Program;Transfer Training    Thank you for this referral.  Kwesi Almanzar, OT  Minutes: [facility] AM-PAC® Daily Activity Inpatient Short Form (6-Clicks)*    How much HELP from another person does the patient currently need    (If the patient hasn't done an activity recently, how much help from another person do you think he/she would need if he/she tried?)   Total (Total A or Dep)   A Lot  (Mod to Max A)   A Little (Sup or Min A)   None (Mod I to I)   Putting on and taking off regular lower body clothing? [] 1 [] 2 [x] 3 [] 4   2. Bathing (including washing, rinsing,      drying)? [] 1 [] 2 [x] 3 [] 4   3. Toileting, which includes using toilet, bedpan or urinal?   [] 1 [] 2 [x] 3 [] 4   4. Putting on and taking off regular upper body clothing? [] 1 [] 2 [x] 3 [] 4   5. Taking care of personal grooming such as brushing teeth? [] 1 [] 2 [x] 3 [] 4   6. Eating meals? [] 1 [] 2 [] 3 [x] 4       Current research shows that an AM-PAC score of 18 or greater is associated with a discharge to the patient's home setting. Based on an AM-PAC score of 19/24 and their current ADL deficits; it is recommended that the patient have 2-3 sessions per week of Occupational Therapy at d/c to increase the patient's independence.

## 2023-02-13 NOTE — OP NOTE
[address] [address],[address]  Total Right Hip Arthroplasty      Patient: Ann Taylor. MRN: [id number]  SSN: [id number]    YOB: 1955  Age: 80 y.o. Sex: male      Date of Surgery: [unfilled]   Preoperative Diagnosis: RIGHT HIP OSTEOARTHRITIS   Postoperative Diagnosis: * No post-op diagnosis entered *   Location: [facility]  Surgeon: Radha Gandhi MD  Assistant:    Angelique CHAPPELL    Anesthesia: General     Procedure: Total Right Hip Arthroplasty    Findings:  Degenerative joint disease of the right hip. Estimated Blood Loss: 400 cc    Specimens: None    Complications: None    Implants:   Implant Name Type Inv. Item Serial No.  Lot No. LRB No. Used Action   SHELL ACET SZ F [id number] 5 CLUS H TRITANIUM PRESSFIT JOSE ANTONIO - [id number]  SHELL ACET SZ F [id number] 5 CLUS H TRITANIUM PRESSFIT JOSE ANTONIO  [employer] [facility] [id number] Right 1 Implanted   SCREW BNE L25MM DIA6. 5MM HEX LO PROF TRIDENT II - [id number]  SCREW BNE L25MM DIA6. 5MM HEX LO PROF TRIDENT II  [employer] [facility] [id number] Right 1 Implanted   INSERT ACET F 0 DEG 36 MM HIP X3 TRIDENT - [id number]  INSERT ACET F 0 DEG 36 MM HIP X3 TRIDENT  [employer] [facility] [id number] Right 1 Implanted   STEM FEM HI OFFSET 4 HIP CLLRD INSIGNIA - [id number]  STEM FEM HI OFFSET 4 HIP CLLRD INSIGNIA  [employer] [facility] [id number] Right 1 Implanted   HEAD FEM [id number] -5MM OFFSET HIP BIOLOX DELT CERAMIC TAPR - [id number]  HEAD FEM [id number] -5MM OFFSET HIP BIOLOX DELT CERAMIC TAPR  [employer] [id number] Right 1 Implanted       Procedure Detail:  After the patient was brought to the operating suite, He was effectively anesthetized using general anesthesia, then transferred to the [place] table and secured in a standard fashion. His right hip was then prepped with Chloroprep and draped in a normal sterile orthopedic fashion. He was given appropriate intravenous antibiotic preoperatively. After proper site indication was performed, a direct anterior approach to the hip was performed using a short Bustos-Smith interval. The incision was placed approximately 3 cm lateral to the anterior superior iliac spine and progressed distally and laterally for a length of approximately 4 inches. Incision was made through the Tensor Fascia Adele with the knife and continued with the Church scissors. An Allis clamp was placed on the medial border of the Tensor Fascia Adele and disection continued on the medial border of Tensor Fascia Adele Muscle. Dissection was continued down to the lateral hip capsule. A Cobra retractor was placed on the lateral hip capsule. A Rajeev Retractor was placed distally and a second Cobra retractor was placed on the medial hip capsule. The Lateral Femoral Circumflex Vessels were identified clamped and cauterized. Anterior capsulotomy was performed. Portions of the capsule were removed. The Cobra retractors were then placed on the femoral neck. The degenerative changes of the hip were noted. Femoral neck osteotomy was then performed. The head and neck were removed. The neck length was confirmed with the image intensification. The labrum was excised. The acetabulum was then reamed up to 57 mm with good bleeding bone in all quadrants obtained. The cup was then irrigated with pulse lavage system. A 58 mm Ant Cluster hole cup was then impacted in place with excellent stable fixation obtained, placing the cup at about 45 degrees of abduction, 20 degrees of anteversion. One screw was placed. The 36 mm inner diameter polyethylene liner was then impacted in place. Attention was turned to the femur, which was delivered into the wound with a combination of extension, external rotation, and adduction, and using the hook on the Ethridge table to deliver the femur into the wound. The box osteotome was used followed by the canal finder and the lateralizing broach. The canal was broached up to a size 3. A trial reduction was then performed with the standard neck offset and negative 5 mm head trial. This was evaluated for leg length and canal fill. The broach was removed. Broaching then proceeded up to a size 4. This broach was removed. The canal was irrigated with the pulse lavage system. The size 4 stem was impacted into position with excellent stability being obtained. The -5.0 x 36 ceramic head was impacted into position after drying the hart taper with a sponge. The final reduction was performed and once again leg lengths and offset were restored radiographically, using the C-arm Excellent functional stability was noted. Final irrigation was done at this time. Exparel was injected into the periosteum, muscle and subcutaneous tissue. The wound was closed in layers. The tensor fascia rodríguez was closed with #1 Vicryl in a running type stitch. Subcutaneous tissue was closed with 2-0 Vicryl in a simple buried stitch, and the skin was closed with a subcuticular 3-0 monocryl followed by the Prineo system. Mepilex dressing was then applied. He tolerated this well, was transferred to the recovery room bed, and taken to recovery room in stable condition. All sponge and needle counts were correct.     Signed By: Minesh Camargo MD     February 13, 2023          Electronically signed by Minesh Camargo MD on 2/13/2023 at 10:33 AM

## 2023-02-13 NOTE — PERIOP NOTE
TRANSFER - IN REPORT:    Verbal report received from CRNA and OR nurse on Munir Dominguez Jr.  being received from OR for routine post-op      Report consisted of patient's Situation, Background, Assessment and   Recommendations(SBAR).     Information from the following report(s) Nurse Handoff Report, Surgery Report, Intake/Output, MAR, and Recent Results was reviewed with the receiving nurse.    Opportunity for questions and clarification was provided.      Assessment completed upon patient's arrival to unit and care assumed.

## 2023-02-13 NOTE — DISCHARGE SUMMARY
Total Hip Discharge Summary    Patient: Lashawn Alejo. Sex: male         MRN: [id number]       YOB: 1955      Age:  80 y.o.        LOS:  LOS: 0 days                DOA: 2/13/2023    Discharge Date: 2/13/2023    Admission Diagnoses: Primary osteoarthritis of right hip [M16.11]    Discharge Diagnoses: This is a 80 y.o. male with a  history of ongoing hip pain secondary to degenerative joint disease. The patient has failed to respond to conservative care. The option of a total hip arthroplasty, anterior approach was discussed with the patient. Risks and benefits of the procedure were explained to the patient as well as other treatment options and possible surgical outcomes. The patient acknowledged and consent was obtained. The patient was therefore scheduled to undergo a right total hip arthroplasty with Dr. Piper Lopez. The patient was taken to the operating room for the above-stated procedure. IV antibiotics were given prior to the incision. SCDs were used for DVT prophylaxis. The patient had an estimated intraoperative blood loss of 400 mL. The patient tolerated the procedure well without any complications, and was taken to the recovery room in stable condition. The patient was then transferred to the postoperative orthopedic floor for convalescence, PT, pain management, as well as discharge planning. Physical therapy and occupational therapy initiated their evaluation and treatment and continued to follow the patient until the patient was discharged. Post operative pain was adequately managed with use of oral narcotics prior to discharge. DVT prophylaxis was initiated on the day of surgery including; aspirin, compression stockings and bilateral foot pumps. At the time of discharge, the patient was able to ambulate safely, go up and down stairs and had an understanding of the explicit discharge precautions and instructions following surgery.   Home Health will come out to assist the patient with this. The patient was discharged to follow up with Dr. Lyndsey Gee in approximately 10 to 14 days. Discharge Condition: Good  DISPOSITION: To home. On the day of discharge the patient was afebrile. Vital signs were stable. The patient was in no acute distress. his RIGHT hip incision was clean, dry, and intact. Extremity was warm and well-perfused, distally neurovascularly intact. DISCHARGE INSTRUCTIONS:  The patient will be discharged home on Aspirin 81 mg PO BID x 1 month for DVT prophylaxis. Continue physical therapy for gait training and strengthening. Continue therapeutic exercises. Follow up in 10 to 14 days with Dr. Lyndsey Gee.       DISCHARGE MEDICATIONS: @[facility]@

## 2023-02-13 NOTE — BRIEF OP NOTE
Brief Postoperative Note      Patient: Arnav Aguilar. YOB: 1955  MRN: [id number]    Date of Procedure: 2/13/2023    Pre-Op Diagnosis: RIGHT HIP OSTEOARTHRITIS    Post-Op Diagnosis: Same       Procedure(s):  RIGHT TOTAL HIP REPLACEMENT ANTERIOR APPROACH WITH C-ARM    Surgeon(s):  Michelle Hill MD    Assistant:  Physician Assistant: Laurie Bradford PA-C    Anesthesia: General    Estimated Blood Loss (mL): 520    Complications: None    Specimens:   * No specimens in log *    Implants:  Implant Name Type Inv. Item Serial No.  Lot No. LRB No. Used Action   SHELL ACET SZ F [id number] 5 CLUS H TRITANIUM PRESSFIT JOSE ANTONIO - [id number]  SHELL ACET SZ F [id number] 5 CLUS H TRITANIUM PRESSFIT JOSE ANTONIO  [employer] [facility] [id number] Right 1 Implanted   SCREW BNE L25MM DIA6. 5MM HEX LO PROF TRIDENT II - [id number]  SCREW BNE L25MM DIA6. 5MM HEX LO PROF TRIDENT II  [employer] [facility] [id number] Right 1 Implanted   INSERT ACET F 0 DEG 36 MM HIP X3 TRIDENT - [id number]  INSERT ACET F 0 DEG 36 MM HIP X3 TRIDENT  [employer] [facility] [id number] Right 1 Implanted   STEM FEM HI OFFSET 4 HIP CLLRD INSIGNIA - [id number]  STEM FEM HI OFFSET 4 HIP CLLRD INSIGNIA  [employer] [facility] [id number] Right 1 Implanted   HEAD FEM [id number] -5MM OFFSET HIP BIOLOX DELT CERAMIC TAPR - [id number]  HEAD FEM [id number] -5MM OFFSET HIP BIOLOX DELT CERAMIC TAPR  [employer] [facility] [id number] Right 1 Implanted         Drains: * No LDAs found *    Findings: Severe DJD    Electronically signed by Laurie Bradford PA-C on 2/13/2023 at 10:44 AM

## 2023-02-13 NOTE — PROGRESS NOTES
Reviewed PTA medication list with patient/caregiver and patient/caregiver denies any additional medications. Patient admits to having a responsible adult care for them at home for at least 24 hours after surgery. Patient encouraged to use gown warming system and informed that using said warming gown to regulate body temperature prior to a procedure has been shown to help reduce the risks of blood clots and infection. Patient's pharmacy of choice verified and documented in PTA medication section.     Dual skin assessment & fall risk band verification completed with Amalia Chun RN.

## 2023-02-13 NOTE — ANESTHESIA PRE PROCEDURE
Department of Anesthesiology  Preprocedure Note       Name:  Miguel A Ovalles. Age:  80 y.o.  :  1955                                          MRN:  [id number]         Date:  2023      Surgeon: Sriram Mari):  Edmund Arnold MD    Procedure: Procedure(s):  RIGHT TOTAL HIP REPLACEMENT ANTERIOR APPROACH WITH C-ARM    Medications prior to admission:   Prior to Admission medications    Medication Sig Start Date End Date Taking?  Authorizing Provider   amLODIPine (NORVASC) 10 MG tablet Take 10 mg by mouth daily   Yes Historical Provider, MD   vitamin C (ASCORBIC ACID) 500 MG tablet Take 1,000 mg by mouth daily   Yes Historical Provider, MD   vitamin D (CHOLECALCIFEROL) 25 MCG (1000 UT) TABS tablet Take 5,000 Units by mouth daily   Yes Historical Provider, MD   vitamin B-12 (CYANOCOBALAMIN) 1000 MCG tablet Take 2,000 mcg by mouth every other day   Yes Historical Provider, MD   DOCOSAHEXAENOIC ACID PO Take 1,360 mg by mouth daily   Yes Historical Provider, MD   loperamide (IMODIUM) 2 MG capsule Take 2 mg by mouth 4 times daily as needed for Diarrhea   Yes Historical Provider, MD   losartan (COZAAR) 100 MG tablet Take 100 mg by mouth nightly   Yes Historical Provider, MD   metoprolol succinate (TOPROL XL) 25 MG extended release tablet Take 25 mg by mouth daily   Yes Historical Provider, MD   Multiple Vitamins-Minerals (THERAPEUTIC MULTIVITAMIN-MINERALS) tablet Take 1 tablet by mouth daily   Yes Historical Provider, MD   valACYclovir (VALTREX) 500 MG tablet Take 500 mg by mouth as needed   Yes Historical Provider, MD   dorzolamide-timolol (COSOPT) 22.3-6.8 MG/ML ophthalmic solution Place 1 drop into both eyes 2 times daily   Yes Historical Provider, MD       Current medications:    Current Facility-Administered Medications   Medication Dose Route Frequency Provider Last Rate Last Admin    ceFAZolin (ANCEF) 2000 mg in sterile water 20 mL IV syringe  2,000 mg IntraVENous On Call to MD Leonides Pruitt ringers IV soln infusion   IntraVENous Continuous Marty Bashir  mL/hr at 02/13/23 0631 New Bag at 02/13/23 0631    tranexamic acid-NaCl IVPB premix 1,000 mg  1,000 mg IntraVENous On Call to [address] [place] [address]           Allergies:  No Known Allergies    Problem List:  There is no problem list on file for this patient. Past Medical History:        Diagnosis Date    Arthritis     Chronic pain     right hip, left ankle    Diabetes mellitus type 2, diet-controlled ([address]     Hypertension     Morbid obesity ([address]     Sleep apnea     CPAP user       Past Surgical History:        Procedure Laterality Date    BREAST LUMPECTOMY Right 2015    GI  1992    removal anal polyp    HEENT Left 2003    Vitrectomy    HEENT      oral surgery       Social History:    Social History     Tobacco Use    Smoking status: Former    Smokeless tobacco: Never   Substance Use Topics    Alcohol use: Not Currently                                Counseling given: Not Answered      Vital Signs (Current):   Vitals:    02/09/23 1740 02/13/23 0556   BP:  (!) 154/69   Pulse:  72   Resp:  18   Temp:  98.1 °F (36.7 °C)   TempSrc:  Temporal   Weight: 250 lb (113.4 kg)    Height: 5' 6\" (1.676 m)                                               BP Readings from Last 3 Encounters:   02/13/23 (!) 154/69       NPO Status:                                                                                 BMI:   Wt Readings from Last 3 Encounters:   02/09/23 250 lb (113.4 kg)   02/02/23 250 lb (113.4 kg)     Body mass index is 40.35 kg/m².     CBC: No results found for: WBC, RBC, HGB, HCT, MCV, RDW, PLT    CMP: No results found for: NA, K, CL, CO2, BUN, CREATININE, GFRAA, AGRATIO, LABGLOM, GLUCOSE, GLU, PROT, CALCIUM, BILITOT, ALKPHOS, AST, ALT    POC Tests:   Recent Labs     02/13/23  0625   POCGLU 121*       Coags:   Lab Results   Component Value Date/Time    PROTIME 13.7 01/23/2023 12:40 PM    INR 1.0 01/23/2023 12:40 PM    APTT 26.3 01/23/2023 12:40 PM       HCG (If Applicable): No results found for: PREGTESTUR, PREGSERUM, HCG, HCGQUANT     ABGs: No results found for: PHART, PO2ART, LSG7FZX, RJQ1WPS, BEART, D8UNJYQE     Type & Screen (If Applicable):  No results found for: LABABO, LABRH    Drug/Infectious Status (If Applicable):  No results found for: HIV, HEPCAB    COVID-19 Screening (If Applicable): No results found for: COVID19        Anesthesia Evaluation    Airway: Mallampati: III  TM distance: <3 FB   Neck ROM: limited  Mouth opening: < 3 FB and > = 3 FB   Dental:          Pulmonary: breath sounds clear to auscultation  (+) sleep apnea:                             Cardiovascular:  Exercise tolerance: good (>4 METS),   (+) hypertension:,         Rhythm: regular  Rate: normal                    Neuro/Psych:   Negative Neuro/Psych ROS              GI/Hepatic/Renal: Neg GI/Hepatic/Renal ROS            Endo/Other:    (+) Diabetes, . Abdominal:   (+) obese,           Vascular: negative vascular ROS. Other Findings:           Anesthesia Plan      general     ASA 3       Induction: intravenous. Anesthetic plan and risks discussed with patient. Plan discussed with CRNA.                     Candy Adams MD   2/13/2023

## 2023-02-14 NOTE — PROGRESS NOTES
Physical Therapy Goals:  Pt goals to be met in 1 day:   Pt will be able to perform supine<>sit SBA for transfer ease at home. Pt will be able to perform sit<>stand SBA for increased ability to transfer at home safely. Pt will be able to participate in gait training >100' w/ RW, WBAT, GB and CGA/SBA for improved ability in home upon d/c. Pt will be able to perform stair training step to pattern, B/U rail and CGA to obtain safe entry into home upon d/c. Pt will be educated regarding HEP per MD protocol for optimal AROM/strength outcomes. [x]  Patient has met MD mobilization critieria for d/c home   [x]  Recommend HH with 24 hour adult care   []  Benefit from additional acute PT session to address:      PHYSICAL THERAPY EVALUATION    Patient: Obed Lizarraga. (79 y.o. male)  Date: 2/13/2023  Primary Diagnosis: Primary osteoarthritis of right hip [M16.11]  Procedure(s) (LRB):  RIGHT TOTAL HIP REPLACEMENT ANTERIOR APPROACH WITH C-ARM (Right) Day of Surgery   Precautions: Fall Risk, Right Lower Extremity Weight Bearing: Weight Bearing As Tolerated, Left Lower Extremity Weight Bearing: Weight Bearing As Tolerated,  PLOF: Independent    ASSESSMENT :  Based on the objective data described below, the patient presents with decreased mobility in regards to bed mobility, transfers, gait quality, gait tolerance, balance, stair negotiation and safety due to R FLORENCIA surgery. Decreased AROM of R hip, dec strength R hip, pain in R hip, decreased sensation of R hip, also impacting functional mobility. Using numerical pain scale, pt rated pain 4 pre/during/post session. Pt and caregiver ed regarding mobility safety, WB, HEP, ice application/use, elevation, environmental safety and home safe techniques. Pt sitting in recliner upon arrival.  Able to perform sit<>stand w/ CGA/SBA. Safety vc required throughout session to reinforce safety. Gait training using RW, GB, WBAT and CGA w/ antalgic gait pattern.   Stair training using step to pattern, B rail use and CGA. Answered all questions by pt and caregiver in regards to PT and mobility. Pt left sitting in recliner w/ all needs within reach and ice pack to R hip. Nurse Luigi Salazar aware of session and outcomes. Recommend HHPT w/ responsible adult care at least 24 hours upon hospital d/c.  DEFICITS/IMPAIRMENTS:    , Body Structures, Functions, Activity Limitations Requiring Skilled Therapeutic Intervention: Decreased functional mobility ; Decreased ROM; Decreased strength;Decreased safe awareness;Decreased sensation;Decreased balance;Decreased coordination; Increased pain    Patient will benefit from skilled intervention to address the above impairments. Patient's rehabilitation potential is considered to be  . Factors which may influence rehabilitation potential include:   []         None noted  []         Mental ability/status  []         Medical condition  []         Home/family situation and support systems  []         Safety awareness  [x]         Pain tolerance/management  []         Other:      PLAN :  Recommendations and Planned Interventions:   Strengthening;ROM;Balance training;Functional mobility training;Transfer training; Endurance training;Gait training;Stair training;Patient/Caregiver education & training    Frequency/Duration: Patient will be followed by physical therapy to address goals, 1-2 times per day/3-5 days per week to address goals. Further Equipment Recommendations for Discharge:  , No,    Discharge Recommendation: Discharge Recommendations: Home with Home health PT    AMPAC: 18/24    This AMPAC score should be considered in conjunction with interdisciplinary team recommendations to determine the most appropriate discharge setting. Patient's social support, diagnosis, medical stability, and prior level of function should also be taken into consideration. SUBJECTIVE:   Patient stated I feel like I want to stretch.     OBJECTIVE DATA SUMMARY:     Past Medical History:   Diagnosis Date    Arthritis     Chronic pain     right hip, left ankle    Diabetes mellitus type 2, diet-controlled (HCC)     Hypertension     Morbid obesity ([facility] [address]     Sleep apnea     CPAP user     Past Surgical History:   Procedure Laterality Date    BREAST LUMPECTOMY Right 2015    GI  1992    removal anal polyp    HEENT Left 2003    Vitrectomy    HEENT      oral surgery     Barriers to Learning/Limitations: physical and anesthesia  Compensate with: visual, verbal, tactile, kinesthetic cues/model  Home Situation:  Social/Functional History  Lives With: Spouse  Type of Home: House  Home Layout: One level  Home Access: Stairs to enter with rails  Entrance Stairs - Number of Steps: 6  Entrance Stairs - Rails: Right  Bathroom Shower/Tub: Walk-in shower  Bathroom Toilet: Standard  Bathroom Equipment: Grab bars in shower  ADL Assistance: Independent  Critical Behavior:  Orientation Level: Oriented to place;Oriented to person;Oriented to situation  WNL  Strength:    Strength: Generally decreased, functional  Tone & Sensation:   Tone: Normal  Sensation: Impaired (R hip)  Range Of Motion:  AROM: Generally decreased, functional  PROM: Generally decreased, functional  Functional Mobility:  Bed Mobility:  Transfers:  Transfer Training  Transfer Training: Yes  Sit to Stand: Contact-guard assistance  Stand to Sit: Stand-by assistance;Contact-guard assistance  Balance:   Balance  Sitting: Intact  Standing: With support  Wheelchair Mobility:  Ambulation/Gait Training:  Gait Training  Gait Training: Yes  Right Side Weight Bearing: As tolerated  Gait  Overall Level of Assistance: Contact-guard assistance; Additional time  Interventions: Safety awareness training; Tactile cues; Verbal cues; Visual cues  Base of Support: Shift to left  Speed/Brandie: Slow  Step Length: Right shortened;Left shortened  Swing Pattern: Right asymmetrical;Left asymmetrical  Stance: Right decreased  Gait Abnormalities: Antalgic;Decreased step clearance; Step to gait  Assistive Device: Gait belt;Walker, rolling  Rail Use: Both  Stairs - Level of Assistance: Contact-guard assistance  Number of Stairs Trained: 5  Therapeutic Exercises/Neuromuscular Re-education:   Pain:  Pain level pre-treatment: 4/10   Pain level post-treatment: 4/10   Pain Intervention(s): Medication (see MAR); Rest, Ice, Repositioning  Response to intervention: Nurse notified, See doc flow    Activity Tolerance:   Activity Tolerance: Patient tolerated evaluation without incident  Please refer to the flowsheet for vital signs taken during this treatment. After treatment:   [x]         Patient left in no apparent distress sitting up in chair  []         Patient left in no apparent distress in bed  [x]         Call bell left within reach  [x]         Nursing notified  [x]         Caregiver present  []         Bed alarm activated  []         SCDs applied    COMMUNICATION/EDUCATION:   Patient Education  Education Given To: Patient;Caregiver  Education Provided: Role of Therapy;Plan of Care;Home Exercise Program;Transfer Training; Fall Prevention Strategies  Education Method: Verbal;Teach Back  Barriers to Learning: None  Education Outcome: Verbalized understanding;Demonstrated understanding;Continued education needed    Thank you for this referral.  Leobardo Velasquez, PT  Minutes: 24      Eval Complexity: [facility] AM-PAC® Basic Mobility Inpatient Short Form (6-Clicks) Version 2    How much HELP from another person does the patient currently need    (If the patient hasn't done an activity recently, how much help from another person do you think he/she would need if he/she tried?)   Total (Total A or Dep)   A Lot  (Mod to Max A)   A Little (Sup or Min A)   None (Mod I to I)   Turning from your back to your side while in a flat bed without using bedrails? [] 1 [] 2 [x] 3 [] 4   2.  Moving from lying on your back to sitting on the side of a flat bed without using bedrails? [] 1 [] 2 [x] 3 [] 4   3. Moving to and from a bed to a chair (including a wheelchair)? [] 1 [] 2 [x] 3 [] 4   4. Standing up from a chair using your arms (e.g., wheelchair, or bedside chair)? [] 1 [] 2 [x] 3 [] 4   5. Walking in hospital room? [] 1 [] 2 [x] 3 [] 4   6. Climbing 3-5 steps with a railing?+   [] 1 [] 2 [x] 3 [] 4   +If stair climbing cannot be assessed, skip item #6. Sum responses from items 1-5. Current research shows that an AM-PAC score of 18 (14 without stairs) or greater is associated with a discharge to the patient's home setting. Based on an AM-PAC score of 18/24 (or **/20 if omitting stairs) and their current functional mobility deficits, it is recommended that the patient have 2-3 sessions per week of Physical Therapy at d/c to increase the patient's independence.

## 2023-03-06 ENCOUNTER — HOSPITAL ENCOUNTER (OUTPATIENT)
Facility: HOSPITAL | Age: 68
Setting detail: RECURRING SERIES
Discharge: HOME | End: 2023-03-09
Payer: MEDICARE

## 2023-03-06 PROCEDURE — 97535 SELF CARE MNGMENT TRAINING: CPT

## 2023-03-06 PROCEDURE — 97162 PT EVAL MOD COMPLEX 30 MIN: CPT

## 2023-03-06 PROCEDURE — 97110 THERAPEUTIC EXERCISES: CPT

## 2023-03-06 NOTE — PROGRESS NOTES
PT DAILY TREATMENT NOTE/Hip/Knee/Ankle EVAL 10-18      Patient Name: Lashawn Alejo. Date: 3/6/2023    : 1955  Insurance: Payor: MEDICARE / Plan: MEDICARE PART A AND B / Product Type: *No Product type* /      Patient  verified yes     Visit #   Current / Total 1 16   Time   In / Out 200 302   Pain   In / Out 3 3     TREATMENT AREA =  Pain in right hip [M25.551]    SUBJECTIVE  []constant []intermittent []improving []worsening []no change since onset    Any medication changes, allergies to medications, adverse drug reactions, diagnosis change, or new procedure performed?: [x] No    [] Yes (see summary sheet for update)  Subjective functional status/changes:     PLOF: functionally independent, no AD, active lifestyle likes to shop, cook, do yard work  Limitations to PLOF: difficulty with lifting leg up, sporadic pain, pain surrounds top of incision; numbness to lateral thigh, difficulty with donning shoes, difficulty with donning pants, difficulty with stairs going sideways, difficulty with extended walking   Mechanism of Injury: Pt is s/p FLORENCAI on 23 via anterior approach. Pt reported that he had been suffering with OA in right hip for 8 years, and he recently had not been able to reach his toes and put on shoes. He received 2 Cortizone injections and second one did not work and he scheduled hip replacement.   Current symptoms/Complaints: 0/10 at best with postioning; 2/41 at worst with certain movements; pt reports they are unable to sleep through the night secondary to pain  Previous Treatment/Compliance: HHPT after surgery   PMHx/Surgical Hx: OA, HTN, LBP, Left knee pain   Work Hx: retired  Living Situation: 2 story home with 7-8 /2 JENNIFER; has RW and SPC lives with wife  Pt Goals: \"Walk be myself\"  Barriers: []pain []financial []time []transportation []other  Motivation: good  Substance use: []Alcohol []Tobacco []other:   Cognition: A & O x 3        OBJECTIVE    24 min [x]Eval - untimed Therapeutic Procedures: Tx Min Billable or 1:1 Min (if diff from Tx Min) Procedure, Rationale, Specifics   23 23 [id number] Therapeutic Exercise (timed):  increase ROM, strength, coordination, balance, and proprioception to improve patient's ability to progress to PLOF and address remaining functional goals. (see flow sheet as applicable)     Details if applicable:     15 15 [id number] Self Care/Home Management (timed):  improve patient knowledge and understanding of pain reducing techniques, positioning, posture/ergonomics, home safety, activity modification, diagnosis/prognosis, and physical therapy expectations, procedures and progression  to improve patient's ability to progress to PLOF and address remaining functional goals.   (see flow sheet as applicable)     Details if applicable:     41 43 [facility] Totals Reminder: bill using total billable min of TIMED therapeutic procedures (example: do not include dry needle or estim unattended, both untimed codes, in totals to left)  8-22 min = 1 unit; 23-37 min = 2 units; 38-52 min = 3 units; 53-67 min = 4 units; 68-82 min = 5 units   Total Total     [x]  Patient Education billed concurrently with other procedures   [x] Review HEP    [] Progressed/Changed HEP, detail:    [] Other detail:           General Evaluation    Gait: decreased stance phase on right side, decreased toe off on right side,  Stairs:step to pattern using single handrail turned to side    ROM:      Right hip: -10 - 105 dgs                             Strength (MMT):                                            Hip Left (1-5) Right (1-5)   Hip Flexion 4 2   Hip ABD 4 2     Knee Left (1-5) Right (1-5)   Knee Flexion 5 4   Knee Extension 5 4+   Ankle DF 5 5     Functional Mobility      Bed Mobility:         Rolling: extended time        Sit-Supine: sup needs left leg to help right leg       Transfers:       Sit-Stand: heavy lean to left         Other Tests / Comments:     5x STS: 19 sec ASSESSMENT/Changes in Function: 81 yo male who [facility] to [facility] with c/o right hip pain pain. Patient is s/p Right FLORENCIA anterior approach on 2/13/23. Patient reports that he had been suffering with OA in right hip for 8 years, and he recently had not been able to reach his toes and put on shoes. He received 2 Cortizone injections and second one did not work so he scheduled hip replacement. Pt reports cont difficulty with lifting leg up, sporadic pain, numbness to lateral thigh, difficulty with donning shoes, difficulty with donning pants, difficulty with stairs,and difficulty with extended walking  Patient demonstrates decreased ROM, decreased strength, impaired posture, impaired gait mechancis, pain and decreased functional mobility tolerance. Patient will continue to benefit from skilled PT services to modify and progress therapeutic interventions, address functional mobility deficits, address ROM deficits, address strength deficits, analyze and address soft tissue restrictions, analyze and cue movement patterns, analyze and modify body mechanics/ergonomics, assess and modify postural abnormalities, address imbalance/dizziness, and instruct in home and community integration  to attain remaining goals. [x]  See Plan of Care  []  See progress note/recertification  []  See Discharge Summary         Progress towards goals / Updated goals:  Short Term Goals: To be accomplished in 10 treatments:  Patient will be independent and compliant with HEP to progress toward goals and restore functional mobility. Eval Status: issued at eval    Patient will improve FOTO score by 13 points to improve functional tolerance for exercise. Eval Status: FOTO 36  FOTO score = an established functional score where 100 = no disability    Patient will improve pain in right hip to 4/10 at worst to improve gait tolerance, bed mobility independence and restore prior level of function.   Eval Status: 8/10 at worst    Pt will have 3/5 hip flexion strength to return to goals of improved gait pattern. Eval Status:   Hip Left (1-5) Right (1-5)   Hip Flexion 4 2     Pt will have painfree right hip AROM WFL to aid in functional mechanics for ambulation/ADLs. Eval Status:   Right hip extension- flexion: -10 - 105 dgs    Long Term Goals: To be accomplished in 22 treatments:  Patient will improve FOTO score by 26 points to improve functional tolerance for bed mobility and transfer. Eval Status: FOTO 36  FOTO score = an established functional score where 100 = no disability    2. Pt will improve 5 times STS by 5 sec in order to increase transfer ability and standing balance. Eval Status: 19 sec     3. Pt will have 4/5 quintin LE strength to return to goals of improved gait pattern, standing tolerance, and transfer tolerance. Eval Status:   Hip Left (1-5) Right (1-5)   Hip Flexion 4 2   Hip ABD 4 2     Knee Left (1-5) Right (1-5)   Knee Flexion 5 4   Knee Extension 5 4+   Ankle DF 5 5       4. Patient will improve pain in right LE to 1-2/10 at worst to improve standing and walking tolerance and restore prior level of function.   Eval Status: 8/10 at worst    PLAN  [x]  Upgrade activities as tolerated     [x]  Continue plan of care  [x]  Update interventions per flow sheet       []  Discharge due to:_  []  Other:_      Matthew Cagle, PT 3/6/2023  1:26 PM

## 2023-03-06 NOTE — PROGRESS NOTES
[facility]  [address] Dayla Osgood, [address] [place]  Ph [phone]  Fx [phone]    Plan of Care/ Statement of Necessity for Physical Therapy Services      Patient name: Deloris Hernandez. Start of Care: 3/6/2023   Referral source: Bettina Ramsey MD : 1955    Medical Diagnosis: Pain in right hip [M25.551]   Onset Date:23    Treatment Diagnosis: pain in right hip   Prior Hospitalization: see medical history Provider#: [id number]   Medications: Verified on Patient summary List   Comorbidities: OA, HTN, LBP, Left knee pain   Prior Level of Function: functionally independent, no AD, active lifestyle likes to shop, cook, do yard work      [employer] of Care and following information is based on the information from the initial evaluation. Assessment/ key information: 81 yo male who presents to [facility] with c/o right hip pain pain. Patient is s/p Right FLORENCIA anterior approach on 23. Patient reports that he had been suffering with OA in right hip for 8 years, and he recently had not been able to reach his toes and put on shoes. He received 2 Cortizone injections and second one did not work so he scheduled hip replacement. Pt reports cont difficulty with lifting leg up, sporadic pain, numbness to lateral thigh, difficulty with donning shoes, difficulty with donning pants, difficulty with stairs,and difficulty with extended walking  Patient demonstrates decreased ROM, decreased strength, impaired posture, impaired gait mechancis, pain and decreased functional mobility tolerance.      Patient will continue to benefit from skilled PT services to modify and progress therapeutic interventions, address functional mobility deficits, address ROM deficits, address strength deficits, analyze and address soft tissue restrictions, analyze and cue movement patterns, analyze and modify body mechanics/ergonomics, assess and modify postural abnormalities, address imbalance/dizziness, and instruct in home and community integration  to attain remaining goals. Evaluation Complexity HistoryMEDIUM  Complexity : 1-2 comorbidities / personal factors will impact the outcome/ POC  ; Examination HIGH Complexity : 4+ Standardized tests and measures addressing body structure, function, activity limitation and / or participation in recreation  ;Presentation MEDIUM Complexity : Evolving with changing characteristics  ; Clinical Decision Making MEDIUM Complexity : FOTO score of 26-74 FOTO score = an established functional score where 100 = no disability  Overall Complexity Rating: MEDIUM  Problem List: pain affecting function, decrease ROM, decrease strength, edema affection function, impaired gait/balance, decrease ADL/functional abilities, decrease activity tolerance, decrease flexibility/joint mobility, and decrease transfer abilities    Treatment Plan may include any combination of the followin Therapeutic Exercise, 33849 Neuromuscular Re-Education, 06212 Manual Therapy, 07673 Therapeutic Activity, 46934 Self Care/Home Management, 05496 Electrical Stim unattended, 34786 Electrical Stim attended, 27330 Vasopneumatic Device, 96822 Aquatic Therapy, and 64443 Gait Training  Patient / Family readiness to learn indicated by: asking questions, trying to perform skills, interest, and return demonstration   Persons(s) to be included in education: patient (P)  Barriers to Learning/Limitations: None  Measures taken if barriers to learning present: NA  Patient Goal (s): Walk be myself  Patient Self Reported Health Status: good  Rehabilitation Potential: good    Short Term Goals: To be accomplished in 10 treatments:  Patient will be independent and compliant with HEP to progress toward goals and restore functional mobility. Eval Status: issued at eval     Patient will improve FOTO score by 13 points to improve functional tolerance for exercise.    Eval Status: FOTO 36  FOTO score = an established functional score where 100 = no disability     Patient will improve pain in right hip to 4/10 at worst to improve gait tolerance, bed mobility independence and restore prior level of function. Eval Status: 8/10 at worst     Pt will have 3/5 hip flexion strength to return to goals of improved gait pattern. Eval Status:   Hip Left (1-5) Right (1-5)   Hip Flexion 4 2      Pt will have painfree right hip AROM WFL to aid in functional mechanics for ambulation/ADLs. Eval Status:   Right hip extension- flexion: -10 - 105 dgs     Long Term Goals: To be accomplished in 22 treatments:  Patient will improve FOTO score by 26 points to improve functional tolerance for bed mobility and transfer. Eval Status: FOTO 36  FOTO score = an established functional score where 100 = no disability     2. Pt will improve 5 times STS by 5 sec in order to increase transfer ability and standing balance. Eval Status: 19 sec      3. Pt will have 4/5 quintin LE strength to return to goals of improved gait pattern, standing tolerance, and transfer tolerance. Eval Status:   Hip Left (1-5) Right (1-5)   Hip Flexion 4 2   Hip ABD 4 2      Knee Left (1-5) Right (1-5)   Knee Flexion 5 4   Knee Extension 5 4+   Ankle DF 5 5         4. Patient will improve pain in right LE to 1-2/10 at worst to improve standing and walking tolerance and restore prior level of function. Eval Status: 8/10 at worst    Frequency / Duration: Patient to be seen 3 times per week for 6 treatments; 2 times per week for 16 treatments    Patient/ Caregiver education and instruction: Diagnosis, prognosis, self care, activity modification, and exercises     [x]  Plan of care has been reviewed with PTA    Certification Period: 3/6/23-6/4/23  Je Salazar, PT 3/6/2023 1:25 PM  ____________________________________________________________________  I certify that the above Therapy Services are being furnished while the patient is under my care.  I agree with the treatment plan and certify that this therapy is necessary.     Physician's Signature:____________Date:_________TIME:________                                      Maria Luisa Lester MD    ** Signature, Date and Time must be completed for valid certification **    [facility] [address] Koffi Lloyd, [address] [facility]  Ph [phone]  Fx [phone]

## 2023-03-08 ENCOUNTER — HOSPITAL ENCOUNTER (OUTPATIENT)
Facility: HOSPITAL | Age: 68
Setting detail: RECURRING SERIES
Discharge: HOME | End: 2023-03-11
Payer: MEDICARE

## 2023-03-08 PROCEDURE — 97110 THERAPEUTIC EXERCISES: CPT

## 2023-03-08 PROCEDURE — 97530 THERAPEUTIC ACTIVITIES: CPT

## 2023-03-08 PROCEDURE — 97112 NEUROMUSCULAR REEDUCATION: CPT

## 2023-03-08 NOTE — PROGRESS NOTES
PHYSICAL / OCCUPATIONAL THERAPY - DAILY TREATMENT NOTE (updated )    Patient Name: Millie Sandhu. Date: 3/8/2023    : 1955  Insurance: Payor: MEDICARE / Plan: MEDICARE PART A AND B / Product Type: *No Product type* /      Patient  verified Yes     Visit #   Current / Total 2 16   Time   In / Out 5:00 5:55   Pain   In / Out 2/10 1/10   Subjective Functional Status/Changes: \"I don't really have any pain so much as discomfort. \"   Changes to:  Meds, Allergies, Med Hx, Sx Hx? If yes, update Summary List no       TREATMENT AREA =  Pain in right hip [M25.551]    OBJECTIVE    Therapeutic Procedures: Tx Min Billable or 1:1 Min (if diff from Tx Min) Procedure, Rationale, Specifics   30 30 35500 Therapeutic Exercise (timed):  increase ROM, strength, coordination, balance, and proprioception to improve patient's ability to progress to PLOF and address remaining functional goals. (see flow sheet as applicable)     Details if applicable:       10 10 96748 Neuromuscular Re-Education (timed):  improve balance, coordination, kinesthetic sense, posture, core stability and proprioception to improve patient's ability to develop conscious control of individual muscles and awareness of position of extremities in order to progress to PLOF and address remaining functional goals. (see flow sheet as applicable)     Details if applicable:     15 15 73072 Therapeutic Activity (timed):  use of dynamic activities replicating functional movements to increase ROM, strength, coordination, balance, and proprioception in order to improve patient's ability to progress to PLOF and address remaining functional goals. (see flow sheet as applicable)     Details if applicable:       14353 Self Care/Home Management (timed):  improve patient knowledge and understanding of pain reducing techniques, positioning, and posture/ergonomics  to improve patient's ability to progress to PLOF and address remaining functional goals.   (see flow sheet as applicable)     Details if applicable:            Details if applicable:     52 51 [facility] Totals Reminder: bill using total billable min of TIMED therapeutic procedures (example: do not include dry needle or estim unattended, both untimed codes, in totals to left)  8-22 min = 1 unit; 23-37 min = 2 units; 38-52 min = 3 units; 53-67 min = 4 units; 68-82 min = 5 units   Total Total     TOTAL TREATMENT TIME:        55     [x]  Patient Education billed concurrently with other procedures   [x] Review HEP    [] Progressed/Changed HEP, detail:    [] Other detail:       Objective Information/Functional Measures/Assessment    Pt arrived in clinic reporting moderate discomfort at surgical sight and amb with RW. Pt performs slow gait speed and mild decreased step length bilaterally but, demonstrates heel strike bilaterally. Pt reported discomfort with general hip flexion and overall tightness present around surgical site. Pt demonstrated great difficulty with general hip flexion in supine, being unable to clear table for supine hip abduction slides with knee extension. Pt reported no increased pain post tx but, did report greatly increased fatigue. Pt will benefit from continued therapy to address unmet goals and to return to prior level of activity. Patient will continue to benefit from skilled PT / OT services to modify and progress therapeutic interventions, analyze and address functional mobility deficits, analyze and address ROM deficits, analyze and address strength deficits, analyze and address soft tissue restrictions, and analyze and cue for proper movement patterns to address functional deficits and attain remaining goals. Progress toward goals / Updated goals:  []  See Progress Note/Recertification    Short Term Goals: To be accomplished in 10 treatments:  Patient will be independent and compliant with HEP to progress toward goals and restore functional mobility.    Eval Status: issued at eval  Current: Pt reports partial compliance with HEP daily 3/8/23     Patient will improve FOTO score by 13 points to improve functional tolerance for exercise. Eval Status: FOTO 36  FOTO score = an established functional score where 100 = no disability     Patient will improve pain in right hip to 4/10 at worst to improve gait tolerance, bed mobility independence and restore prior level of function. Eval Status: 8/10 at worst   Current:     Pt will have 3/5 hip flexion strength to return to goals of improved gait pattern. Eval Status:   Hip Left (1-5) Right (1-5)   Hip Flexion 4 2      Pt will have painfree right hip AROM WFL to aid in functional mechanics for ambulation/ADLs. Eval Status:   Right hip extension- flexion: -10 - 105 dgs     Long Term Goals: To be accomplished in 22 treatments:  Patient will improve FOTO score by 26 points to improve functional tolerance for bed mobility and transfer. Eval Status: FOTO 36  Current:   FOTO score = an established functional score where 100 = no disability     2. Pt will improve 5 times STS by 5 sec in order to increase transfer ability and standing balance. Eval Status: 19 sec      3. Pt will have 4/5 quintin LE strength to return to goals of improved gait pattern, standing tolerance, and transfer tolerance. Eval Status:   Hip Left (1-5) Right (1-5)   Hip Flexion 4 2   Hip ABD 4 2      Knee Left (1-5) Right (1-5)   Knee Flexion 5 4   Knee Extension 5 4+   Ankle DF 5 5         4. Patient will improve pain in right LE to 1-2/10 at worst to improve standing and walking tolerance and restore prior level of function.   Eval Status: 8/10 at worst      PLAN  Yes  Continue plan of care  []  Upgrade activities as tolerated  []  Discharge due to :  []  Other:    London Blair PTA    3/8/2023    10:34 AM    Future Appointments   Date Time Provider Luisa Kauffman   3/8/2023  5:00 PM London Blair [facility]   3/10/2023  2:00 PM Julai Leggett, [address]   3/13/2023 2:00 PM Sugey Hagan, PT [facility]   3/15/2023 12:00 PM Alt [address], PT [facility]   3/17/2023  2:00 PM Alt [address], PT [facility]   3/21/2023  3:00 PM [facility]   3/22/2023 12:30 PM [facility]   3/27/2023 12:30 PM [facility]   3/29/2023 12:30 PM Alt [address], PT [facility]   4/3/2023 12:30 PM [facility]   4/5/2023 12:30 PM Alt [address], PT [facility]   4/10/2023 12:30 PM [facility]   4/12/2023 12:30 PM Alt [address], PT [facility]

## 2023-03-10 ENCOUNTER — HOSPITAL ENCOUNTER (OUTPATIENT)
Facility: HOSPITAL | Age: 68
Setting detail: RECURRING SERIES
Discharge: HOME | End: 2023-03-13
Payer: MEDICARE

## 2023-03-10 PROCEDURE — 97530 THERAPEUTIC ACTIVITIES: CPT

## 2023-03-10 PROCEDURE — 97110 THERAPEUTIC EXERCISES: CPT

## 2023-03-10 PROCEDURE — 97112 NEUROMUSCULAR REEDUCATION: CPT

## 2023-03-10 NOTE — PROGRESS NOTES
PHYSICAL / OCCUPATIONAL THERAPY - DAILY TREATMENT NOTE (updated )    Patient Name: Ortega Adan. Date: 3/10/2023    : 1955  Insurance: Payor: MEDICARE / Plan: MEDICARE PART A AND B / Product Type: *No Product type* /      Patient  verified Yes     Visit #   Current / Total 3 22   Time   In / Out 2:00 2:53   Pain   In / Out 2/10 0/10   Subjective Functional Status/Changes: \"I was actually able to lay on my left side to sleep for a few minutes last night, I haven't been able to do that for about 6 years. \"   Changes to:  Meds, Allergies, Med Hx, Sx Hx? If yes, update Summary List no       TREATMENT AREA =  Pain in right hip [M25.551]    OBJECTIVE      Therapeutic Procedures: Tx Min Billable or 1:1 Min (if diff from Tx Min) Procedure, Rationale, Specifics   30  96100 Therapeutic Exercise (timed):  increase ROM, strength, coordination, balance, and proprioception to improve patient's ability to progress to PLOF and address remaining functional goals. (see flow sheet as applicable)     Details if applicable:       12  26256 Therapeutic Activity (timed):  use of dynamic activities replicating functional movements to increase ROM, strength, coordination, balance, and proprioception in order to improve patient's ability to progress to PLOF and address remaining functional goals. (see flow sheet as applicable)     Details if applicable:     11  43640 Neuromuscular Re-Education (timed):  improve balance, coordination, kinesthetic sense, posture, core stability and proprioception to improve patient's ability to develop conscious control of individual muscles and awareness of position of extremities in order to progress to PLOF and address remaining functional goals.  (see flow sheet as applicable)     Details if applicable:            Details if applicable:            Details if applicable:     48  [address] Totals Reminder: bill using total billable min of TIMED therapeutic procedures (example: do not include dry needle or estim unattended, both untimed codes, in totals to left)  8-22 min = 1 unit; 23-37 min = 2 units; 38-52 min = 3 units; 53-67 min = 4 units; 68-82 min = 5 units   Total Total     TOTAL TREATMENT TIME:        53     [x]  Patient Education billed concurrently with other procedures   [x] Review HEP    [] Progressed/Changed HEP, detail:    [] Other detail:       Objective Information/Functional Measures/Assessment    Patient remains challenged with clearing his right LE from table during supine hip abduction secondary to continued weakness of right>left hip flexors and core. Patient required cueing throughout all exercises as the exercises are still fairly new to him. Patient will continue to benefit from skilled PT / OT services to modify and progress therapeutic interventions, analyze and address functional mobility deficits, analyze and address ROM deficits, analyze and address strength deficits, analyze and address soft tissue restrictions, analyze and cue for proper movement patterns, analyze and modify for postural abnormalities, and instruct in home and community integration to address functional deficits and attain remaining goals. Progress toward goals / Updated goals:  []  See Progress Note/Recertification    Short Term Goals: To be accomplished in 10 treatments:  Patient will be independent and compliant with HEP to progress toward goals and restore functional mobility. Eval Status: issued at eval  Current: Pt reports partial compliance with HEP daily 3/8/23, met     Patient will improve FOTO score by 13 points to improve functional tolerance for exercise. Eval Status: FOTO 36  FOTO score = an established functional score where 100 = no disability     Patient will improve pain in right hip to 4/10 at worst to improve gait tolerance, bed mobility independence and restore prior level of function.   Eval Status: 8/10 at worst   Current: 6/10 at worst in the last week      3/10/23, progressing     Pt will have 3/5 hip flexion strength to return to goals of improved gait pattern. Eval Status:   Hip Left (1-5) Right (1-5)   Hip Flexion 4 2      Pt will have painfree right hip AROM WFL to aid in functional mechanics for ambulation/ADLs. Eval Status:   Right hip extension- flexion: -10 - 105 dgs     Long Term Goals: To be accomplished in 22 treatments:  Patient will improve FOTO score by 26 points to improve functional tolerance for bed mobility and transfer. Eval Status: FOTO 36  Current:   FOTO score = an established functional score where 100 = no disability     2. Pt will improve 5 times STS by 5 sec in order to increase transfer ability and standing balance. Eval Status: 19 sec      3. Pt will have 4/5 quintin LE strength to return to goals of improved gait pattern, standing tolerance, and transfer tolerance. Eval Status:   Hip Left (1-5) Right (1-5)   Hip Flexion 4 2   Hip ABD 4 2      Knee Left (1-5) Right (1-5)   Knee Flexion 5 4   Knee Extension 5 4+   Ankle DF 5 5         4. Patient will improve pain in right LE to 1-2/10 at worst to improve standing and walking tolerance and restore prior level of function.   Eval Status: 8/10 at worst      PLAN  Yes  Continue plan of care  []  Upgrade activities as tolerated  []  Discharge due to :  []  Other:    Lino Carias PT    3/10/2023    8:33 AM    Future Appointments   Date Time Provider Luisa Kauffman   3/10/2023  2:00 PM Lino Carias PT [facility]   3/13/2023  2:00 PM Lisbeth Liu PT [facility]   3/15/2023 12:00 PM Lino Carias PT [facility]   3/17/2023  2:00 PM Lino Carias PT [facility]   3/21/2023  3:00 PM [facility]   [facility]   [facility]27/2023 [facility]   3/29/2023 12:30 PM Lino Carias, PT [facility]   4/3/2023 12:30 PM Kati May PTA [facility]   4/5/2023 12:30 PM Parrish Bass, PT [facility]   4/10/2023 12:30 PM Parker Haskins [facility]   4/12/2023 12:30 PM Onesimo Santos 64, PT [facility]

## 2023-03-13 ENCOUNTER — HOSPITAL ENCOUNTER (OUTPATIENT)
Facility: HOSPITAL | Age: 68
Setting detail: RECURRING SERIES
Discharge: HOME | End: 2023-03-16
Payer: MEDICARE

## 2023-03-13 PROCEDURE — 97530 THERAPEUTIC ACTIVITIES: CPT

## 2023-03-13 PROCEDURE — 97110 THERAPEUTIC EXERCISES: CPT

## 2023-03-13 PROCEDURE — 97112 NEUROMUSCULAR REEDUCATION: CPT

## 2023-03-13 NOTE — PROGRESS NOTES
PHYSICAL / OCCUPATIONAL THERAPY - DAILY TREATMENT NOTE (updated )    Patient Name: Ortega Walker. Date: 3/13/2023    : 1955  Insurance: Payor: MEDICARE / Plan: MEDICARE PART A AND B / Product Type: *No Product type* /      Patient  verified Yes     Visit #   Current / Total 4 22   Time   In / Out 3:30 4:24   Pain   In / Out 2/10 0/10   Subjective Functional Status/Changes: \"I walked around [address] this weekend without the walker. \"   Changes to:  Meds, Allergies, Med Hx, Sx Hx? If yes, update Summary List no       TREATMENT AREA =  Pain in right hip [M25.551]    OBJECTIVE    Therapeutic Procedures: Tx Min Billable or 1:1 Min (if diff from Tx Min) Procedure, Rationale, Specifics   30 30 47163 Therapeutic Exercise (timed):  increase ROM, strength, coordination, balance, and proprioception to improve patient's ability to progress to PLOF and address remaining functional goals. (see flow sheet as applicable)     Details if applicable:       10 10 94405 Neuromuscular Re-Education (timed):  improve balance, coordination, kinesthetic sense, posture, core stability and proprioception to improve patient's ability to develop conscious control of individual muscles and awareness of position of extremities in order to progress to PLOF and address remaining functional goals. (see flow sheet as applicable)     Details if applicable:      Therapeutic Activity (timed):  use of dynamic activities replicating functional movements to increase ROM, strength, coordination, balance, and proprioception in order to improve patient's ability to progress to PLOF and address remaining functional goals.   (see flow sheet as applicable)     Details if applicable:            Details if applicable:            Details if applicable:     54 47 [facility] Totals Reminder: bill using total billable min of TIMED therapeutic procedures (example: do not include dry needle or estim unattended, both untimed codes, in totals to left)  8-22 min = 1 unit; 23-37 min = 2 units; 38-52 min = 3 units; 53-67 min = 4 units; 68-82 min = 5 units   Total Total     TOTAL TREATMENT TIME:        54     [x]  Patient Education billed concurrently with other procedures   [x] Review HEP    [] Progressed/Changed HEP, detail:    [] Other detail:       Objective Information/Functional Measures/Assessment    Pt arrived in clinic amb with RW and demonstrated moderate bilateral heel strike. Pt reports continued tightness in anterior Right hip. Patient will continue to benefit from skilled PT / OT services to modify and progress therapeutic interventions, analyze and address functional mobility deficits, analyze and address ROM deficits, analyze and address strength deficits, analyze and address soft tissue restrictions, and analyze and cue for proper movement patterns to address functional deficits and attain remaining goals. Progress toward goals / Updated goals:  []  See Progress Note/Recertification    Short Term Goals: To be accomplished in 10 treatments:  Patient will be independent and compliant with HEP to progress toward goals and restore functional mobility. Eval Status: issued at eval  Current: Pt reports partial compliance with HEP daily 3/8/23, met     Patient will improve FOTO score by 13 points to improve functional tolerance for exercise. Eval Status: FOTO 36  Current:   FOTO score = an established functional score where 100 = no disability     Patient will improve pain in right hip to 4/10 at worst to improve gait tolerance, bed mobility independence and restore prior level of function. Eval Status: 8/10 at worst   Current: 6/10 at worst in the last week      3/10/23, progressing     Pt will have 3/5 hip flexion strength to return to goals of improved gait pattern. Eval Status:   Hip Left (1-5) Right (1-5)   Hip Flexion 4 2      Pt will have painfree right hip AROM WFL to aid in functional mechanics for ambulation/ADLs.   Eval Status: Right hip extension- flexion: -10 - 105 dgs  Current:      Long Term Goals: To be accomplished in 22 treatments:  Patient will improve FOTO score by 26 points to improve functional tolerance for bed mobility and transfer. Eval Status: FOTO 36  Current:   FOTO score = an established functional score where 100 = no disability     2. Pt will improve 5 times STS by 5 sec in order to increase transfer ability and standing balance. Eval Status: 19 sec    Current: Pt able to perform STS from low bed without UE support 3/13/23    3. Pt will have 4/5 quintin LE strength to return to goals of improved gait pattern, standing tolerance, and transfer tolerance. Eval Status:   Hip Left (1-5) Right (1-5)   Hip Flexion 4 2   Hip ABD 4 2      Knee Left (1-5) Right (1-5)   Knee Flexion 5 4   Knee Extension 5 4+   Ankle DF 5 5    Current:      4. Patient will improve pain in right LE to 1-2/10 at worst to improve standing and walking tolerance and restore prior level of function.   Eval Status: 8/10 at worst    Current:     PLAN  Yes  Continue plan of care  []  Upgrade activities as tolerated  []  Discharge due to :  []  Other:    Roseanne Bean PTA    3/13/2023    11:10 AM    Future Appointments   Date Time Provider Luias Kauffman   3/13/2023  3:30 PM Roseanne Bean PTA [facility]   3/15/2023 12:00 PM Alt [address], PT [facility]   3/17/2023  2:00 PM Alt [address], PT [facility]   3/21/2023  3:00 PM Roseanne Bean PTA [facility]   3/22/2023 12:30 PM Roseanne Bean [facility]   3/27/2023 12:30 PM Roseanne Bean PTA [facility]   3/29/2023 12:30 PM Abbi Bass, [facility]   4/3/2023 12:30 PM Corey Giron [facility]   4/5/2023 12:30 PM Alt [address], PT [facility]   4/10/2023 12:30 PM Corey Giron [facility]   4/12/2023 12:30 PM Onesimo Santos 64, PT [facility]

## 2023-03-15 ENCOUNTER — HOSPITAL ENCOUNTER (OUTPATIENT)
Facility: HOSPITAL | Age: 68
Setting detail: RECURRING SERIES
Discharge: HOME | End: 2023-03-18
Payer: MEDICARE

## 2023-03-15 PROCEDURE — 97530 THERAPEUTIC ACTIVITIES: CPT

## 2023-03-15 PROCEDURE — 97110 THERAPEUTIC EXERCISES: CPT

## 2023-03-15 PROCEDURE — 97112 NEUROMUSCULAR REEDUCATION: CPT

## 2023-03-15 NOTE — PROGRESS NOTES
PHYSICAL / OCCUPATIONAL THERAPY - DAILY TREATMENT NOTE (updated )    Patient Name: Adeola Beltrán. Date: 3/15/2023    : 1955  Insurance: Payor: MEDICARE / Plan: MEDICARE PART A AND B / Product Type: *No Product type* /      Patient  verified Yes     Visit #   Current / Total 5 22   Time   In / Out 12:00 12:59   Pain   In / Out -3/10 3/10   Subjective Functional Status/Changes: Patient states he had a very hard time sleeping last night because of being \"uncomfortable\" in his right hip. Patient states his ability to raise his right LE up onto an ottoman at home is improving. Changes to:  Meds, Allergies, Med Hx, Sx Hx? If yes, update Summary List no       TREATMENT AREA =  Pain in right hip [M25.551]    OBJECTIVE      Therapeutic Procedures: Tx Min Billable or 1:1 Min (if diff from Tx Min) Procedure, Rationale, Specifics   26  18650 Therapeutic Exercise (timed):  increase ROM, strength, coordination, balance, and proprioception to improve patient's ability to progress to PLOF and address remaining functional goals. (see flow sheet as applicable)     Details if applicable:       18  63111 Therapeutic Activity (timed):  use of dynamic activities replicating functional movements to increase ROM, strength, coordination, balance, and proprioception in order to improve patient's ability to progress to PLOF and address remaining functional goals. (see flow sheet as applicable)     Details if applicable:     15  99459 Neuromuscular Re-Education (timed):  improve balance, coordination, kinesthetic sense, posture, core stability and proprioception to improve patient's ability to develop conscious control of individual muscles and awareness of position of extremities in order to progress to PLOF and address remaining functional goals.  (see flow sheet as applicable)     Details if applicable:     61  [address] Reminder: bill using total billable min of TIMED therapeutic procedures (example: do not include dry needle or estim unattended, both untimed codes, in totals to left)  8-22 min = 1 unit; 23-37 min = 2 units; 38-52 min = 3 units; 53-67 min = 4 units; 68-82 min = 5 units   Total Total     TOTAL TREATMENT TIME:        59     [x]  Patient Education billed concurrently with other procedures   [x] Review HEP    [] Progressed/Changed HEP, detail:    [] Other detail:       Objective Information/Functional Measures/Assessment    Patient is not yet able to perform flexion SLR with right LE secondary to decreased LE strength, so modified this activity to be supine marching with TAA instead to still address core/LE strengthening with a shorter lever arm. Patient also remains challenged with supine hip abductions using his right LE, though this is improving. Patient is making slow, but steady progress towards goals, and remains a good candidate for further skilled PT interventions to improve his ability and tolerance to functional activities. Patient will continue to benefit from skilled PT / OT services to modify and progress therapeutic interventions, analyze and address functional mobility deficits, analyze and address ROM deficits, analyze and address strength deficits, analyze and address soft tissue restrictions, analyze and cue for proper movement patterns, analyze and modify for postural abnormalities, and instruct in home and community integration to address functional deficits and attain remaining goals. Progress toward goals / Updated goals:  []  See Progress Note/Recertification    Short Term Goals: To be accomplished in 10 treatments:  Patient will be independent and compliant with HEP to progress toward goals and restore functional mobility. Eval Status: issued at eval  Current: Patient reports daily compliance with his HEP, in addition to walking around his home. 3/15/23, met     Patient will improve FOTO score by 13 points to improve functional tolerance for exercise.    Eval Status: FOTO 36  Current: 53   3/15/23, met  FOTO score = an established functional score where 100 = no disability     Patient will improve pain in right hip to 4/10 at worst to improve gait tolerance, bed mobility independence and restore prior level of function. Eval Status: 8/10 at worst   Current: 6/10 at worst in the last week      3/10/23, progressing     Pt will have 3/5 hip flexion strength to return to goals of improved gait pattern. Eval Status:   Hip Left (1-5) Right (1-5)   Hip Flexion 4 2      Pt will have painfree right hip AROM WFL to aid in functional mechanics for ambulation/ADLs. Eval Status:   Right hip extension- flexion: -10 - 105 dgs  Current:      Long Term Goals: To be accomplished in 22 treatments:    Patient will improve FOTO score by 26 points to improve functional tolerance for bed mobility and transfer. Eval Status: FOTO 36  Current: 53   3/15/23, progressing  FOTO score = an established functional score where 100 = no disability     2. Pt will improve 5 times STS by 5 sec in order to increase transfer ability and standing balance. Eval Status: 19 sec    Current: Pt able to perform STS from low bed without UE support 3/13/23     3. Pt will have 4/5 quintin LE strength to return to goals of improved gait pattern, standing tolerance, and transfer tolerance. Eval Status:   Hip Left (1-5) Right (1-5)   Hip Flexion 4 2   Hip ABD 4 2      Knee Left (1-5) Right (1-5)   Knee Flexion 5 4   Knee Extension 5 4+   Ankle DF 5 5    Current:      4. Patient will improve pain in right LE to 1-2/10 at worst to improve standing and walking tolerance and restore prior level of function.   Eval Status: 8/10 at worst               Current:       PLAN  Yes  Continue plan of care  []  Upgrade activities as tolerated  []  Discharge due to :  []  Other:    Alonso Marlow PT    3/15/2023    8:57 AM    Future Appointments   Date Time Provider Luisa Kauffman   3/15/2023 12:00 PM DARNELL Luke [facility]   3/17/2023  2:00 PM Alonso Marlow PT [facility]   3/21/2023  3:00 PM [facility]   3/22/2023 12:30 PM [facility]   3/27/2023 12:30 PM [facility]   3/29/2023 12:30 PM Alonso Marlow PT [facility]   4/3/2023 12:30 PM [facility]   4/5/2023 12:30 PM Alonso Marlow PT [facility]   4/10/2023 12:30 PM [facility]   4/12/2023 12:30 PM Alonso Marlow PT [facility]

## 2023-03-17 ENCOUNTER — HOSPITAL ENCOUNTER (OUTPATIENT)
Facility: HOSPITAL | Age: 68
Setting detail: RECURRING SERIES
End: 2023-03-17
Payer: MEDICARE

## 2023-03-17 ENCOUNTER — TELEPHONE (OUTPATIENT)
Facility: HOSPITAL | Age: 68
End: 2023-03-17

## 2023-03-21 ENCOUNTER — HOSPITAL ENCOUNTER (OUTPATIENT)
Facility: HOSPITAL | Age: 68
Setting detail: RECURRING SERIES
Discharge: HOME | End: 2023-03-24
Payer: MEDICARE

## 2023-03-21 PROCEDURE — 97112 NEUROMUSCULAR REEDUCATION: CPT

## 2023-03-21 PROCEDURE — 97530 THERAPEUTIC ACTIVITIES: CPT

## 2023-03-21 PROCEDURE — 97110 THERAPEUTIC EXERCISES: CPT

## 2023-03-21 NOTE — PROGRESS NOTES
score by 13 points to improve functional tolerance for exercise. Eval Status: FOTO 36  Current: 53   3/15/23, met  FOTO score = an established functional score where 100 = no disability     Patient will improve pain in right hip to 4/10 at worst to improve gait tolerance, bed mobility independence and restore prior level of function. Eval Status: 8/10 at worst   Current: 16/10 at worst in the last week      3/10/23, progressing     Pt will have 3/5 hip flexion strength to return to goals of improved gait pattern. Eval Status:   Hip Left (1-5) Right (1-5)   Hip Flexion 4 2      Pt will have painfree right hip AROM WFL to aid in functional mechanics for ambulation/ADLs. Eval Status:   Right hip extension- flexion: -10 - 105 dgs  Current: Right Hip Flexion (in supine): 96 deg 3/21/23      Long Term Goals: To be accomplished in 22 treatments:     Patient will improve FOTO score by 26 points to improve functional tolerance for bed mobility and transfer. Eval Status: FOTO 36  Current: 53   3/15/23, progressing  FOTO score = an established functional score where 100 = no disability     2. Pt will improve 5 times STS by 5 sec in order to increase transfer ability and standing balance. Eval Status: 19 sec    Current: Pt able to perform STS from low bed without UE support 3/13/23     3. Pt will have 4/5 quintin LE strength to return to goals of improved gait pattern, standing tolerance, and transfer tolerance. Eval Status:   Hip Left (1-5) Right (1-5)   Hip Flexion 4 2   Hip ABD 4 2      Knee Left (1-5) Right (1-5)   Knee Flexion 5 4   Knee Extension 5 4+   Ankle DF 5 5    Current:      4. Patient will improve pain in right LE to 1-2/10 at worst to improve standing and walking tolerance and restore prior level of function.   Eval Status: 8/10 at worst               Current:          PLAN  Yes  Continue plan of care  []  Upgrade activities as tolerated  []  Discharge due to :  []  Other:    Norvel Irons, PTA

## 2023-03-22 ENCOUNTER — HOSPITAL ENCOUNTER (OUTPATIENT)
Facility: HOSPITAL | Age: 68
Setting detail: RECURRING SERIES
Discharge: HOME | End: 2023-03-25
Payer: MEDICARE

## 2023-03-22 PROCEDURE — 97530 THERAPEUTIC ACTIVITIES: CPT

## 2023-03-22 PROCEDURE — 97110 THERAPEUTIC EXERCISES: CPT

## 2023-03-22 PROCEDURE — 97112 NEUROMUSCULAR REEDUCATION: CPT

## 2023-03-22 NOTE — PROGRESS NOTES
sheet as applicable)     Details if applicable:            Details if applicable:     41 [address] [facility] Totals Reminder: bill using total billable min of TIMED therapeutic procedures (example: do not include dry needle or estim unattended, both untimed codes, in totals to left)  8-22 min = 1 unit; 23-37 min = 2 units; 38-52 min = 3 units; 53-67 min = 4 units; 68-82 min = 5 units   Total Total     TOTAL TREATMENT TIME:        38     [x]  Patient Education billed concurrently with other procedures   [x] Review HEP    [] Progressed/Changed HEP, detail:    [] Other detail:       Objective Information/Functional Measures/Assessment    Pt arrived in clinic reporting increased soreness and fatigue but, no pain in hip. Pt reports sleeping better with less interruptions in sleep due to hip discomfort. Pt was able to tolerate therex moderately well but, did report increased soreness and fatigue. Pt was able to tolerate reduced repetitions of therex due to back to back days of therapy but, pt maintained good form throughout. Pt denied pain post tx. Pt will benefit from continued therapy to address unmet goals and to return to prior level of activity. Patient will continue to benefit from skilled PT / OT services to modify and progress therapeutic interventions, analyze and address functional mobility deficits, analyze and address ROM deficits, analyze and address strength deficits, analyze and address soft tissue restrictions, and analyze and cue for proper movement patterns to address functional deficits and attain remaining goals. Progress toward goals / Updated goals:  []  See Progress Note/Recertification    Short Term Goals: To be accomplished in 10 treatments:  Patient will be independent and compliant with HEP to progress toward goals and restore functional mobility. [facility] Status: issued at [facility]  Current: Patient reports daily compliance with his HEP, in addition to walking around his home.    3/15/23, met

## 2023-03-27 ENCOUNTER — HOSPITAL ENCOUNTER (OUTPATIENT)
Facility: HOSPITAL | Age: 68
Setting detail: RECURRING SERIES
Discharge: HOME | End: 2023-03-30
Payer: MEDICARE

## 2023-03-27 PROCEDURE — 97112 NEUROMUSCULAR REEDUCATION: CPT

## 2023-03-27 PROCEDURE — 97530 THERAPEUTIC ACTIVITIES: CPT

## 2023-03-27 PROCEDURE — 97110 THERAPEUTIC EXERCISES: CPT

## 2023-03-27 PROCEDURE — 97535 SELF CARE MNGMENT TRAINING: CPT

## 2023-03-27 NOTE — PROGRESS NOTES
continued improved general LE strength and stability with gait and transfers 3/27/23     4. Patient will improve pain in right LE to 1-2/10 at worst to improve standing and walking tolerance and restore prior level of function.   Eval Status: 8/10 at worst               Current:       PLAN  Yes  Continue plan of care  []  Upgrade activities as tolerated  []  Discharge due to :  []  Other:    Renay Cheadle, PTA    3/27/2023    10:30 AM    Future Appointments   Date Time Provider Luisa Kauffman   3/27/2023 12:30 PM [facility]   3/29/2023 12:30 PM Hima Rain PT [facility]   4/3/2023 12:30 PM [facility]   4/5/2023 12:30 PM Hima Rain PT [facility]   4/10/2023 12:30 PM [facility]   4/12/2023 12:30 PM Hima Rain PT [facility]

## 2023-03-29 ENCOUNTER — HOSPITAL ENCOUNTER (OUTPATIENT)
Facility: HOSPITAL | Age: 68
Setting detail: RECURRING SERIES
Discharge: HOME | End: 2023-04-01
Payer: MEDICARE

## 2023-03-29 PROCEDURE — 97112 NEUROMUSCULAR REEDUCATION: CPT

## 2023-03-29 PROCEDURE — 97530 THERAPEUTIC ACTIVITIES: CPT

## 2023-03-29 PROCEDURE — 97110 THERAPEUTIC EXERCISES: CPT

## 2023-03-29 NOTE — PROGRESS NOTES
Other:    Rocky Band, PT    3/29/2023    9:27 AM    Future Appointments   Date Time Provider Luisa Daly   3/29/2023 12:30 PM Lucita Whitney, [facility]   4/3/2023 12:30 PM [facility]   4/5/2023 12:30 PM Rocky Torrez, PT [facility]   4/10/2023 12:30 PM [facility]   4/12/2023 12:30 PM Rocky Torrez, PT [facility]

## 2023-04-03 ENCOUNTER — HOSPITAL ENCOUNTER (OUTPATIENT)
Facility: HOSPITAL | Age: 68
Setting detail: RECURRING SERIES
Discharge: HOME | End: 2023-04-06
Payer: MEDICARE

## 2023-04-03 PROCEDURE — 97530 THERAPEUTIC ACTIVITIES: CPT

## 2023-04-03 PROCEDURE — 97110 THERAPEUTIC EXERCISES: CPT

## 2023-04-03 PROCEDURE — 97535 SELF CARE MNGMENT TRAINING: CPT

## 2023-04-03 NOTE — PROGRESS NOTES
Physical Therapy Discharge Instructions    [facility] [address] Reddy George, [address] [facility]  Ph [phone]  Fx [phone]      Patient: Aguila Hood.   : 1955      Continue Home Exercise Program 1 times per day for 2 weeks, then decrease to 3 times per week      Continue with    [x] Ice  as needed      [x] Heat           Follow up with MD:     [] Upon completion of therapy     [x] As needed      Recommendations:     [x]   Return to activity with home program    []   Return to activity with the following modifications:       []Post Rehab Program    []Join Independent aquatic program     []Return to/join local gym        Additional Comments:           Alannah Perez, PTA 4/3/2023 1:40 PM

## 2023-04-03 NOTE — PROGRESS NOTES
PHYSICAL / OCCUPATIONAL THERAPY - DAILY TREATMENT NOTE (updated )    Patient Name: Radha Rodney. Date: 4/3/2023    : 1955  Insurance: Payor: MEDICARE / Plan: MEDICARE PART A AND B / Product Type: *No Product type* /      Patient  verified Yes     Visit #   Current / Total 10 22   Time   In / Out 12:17 12:58   Pain   In / Out 0/10 0/10   Subjective Functional Status/Changes: \"I don't have any pian. My walking without the cane is getting better. \"   Changes to:  Meds, Allergies, Med Hx, Sx Hx? If yes, update Summary List no       TREATMENT AREA =  Pain in right hip [M25.551]    OBJECTIVE    Therapeutic Procedures: Tx Min Billable or 1:1 Min (if diff from Tx Min) Procedure, Rationale, Specifics   10 10 96715 Therapeutic Exercise (timed):  increase ROM, strength, coordination, balance, and proprioception to improve patient's ability to progress to PLOF and address remaining functional goals. (see flow sheet as applicable)     Details if applicable:         09378 Neuromuscular Re-Education (timed):  improve balance, coordination, kinesthetic sense, posture, core stability and proprioception to improve patient's ability to develop conscious control of individual muscles and awareness of position of extremities in order to progress to PLOF and address remaining functional goals. (see flow sheet as applicable)     Details if applicable:     11  57259 Therapeutic Activity (timed):  use of dynamic activities replicating functional movements to increase ROM, strength, coordination, balance, and proprioception in order to improve patient's ability to progress to PLOF and address remaining functional goals.   (see flow sheet as applicable)     Details if applicable:     10 83 27678 Self Care/Home Management (timed):  improve patient knowledge and understanding of pain reducing techniques, positioning, and posture/ergonomics  to improve patient's ability to progress to PLOF and address remaining functional

## 2023-04-04 NOTE — PROGRESS NOTES
[facility]  [address], [address] [facility]  Ph [phone]  Fx [phone]    Physical Therapy Discharge Summary    Patient name: Chuck Solitario Start of Care: 3/6/2023   Referral source: Manju Walter MD : 1955               Medical Diagnosis: Pain in right hip [M25.551]    Onset Date:23               Treatment Diagnosis: pain in right hip   Prior Hospitalization: see medical history Provider#: [id number]   Medications: Verified on Patient summary List   Comorbidities: OA, HTN, LBP, Left knee pain   Prior Level of Function: functionally independent, no AD, active lifestyle likes to shop, cook, do yard work      Vis[facility] from [facility]: 10    Missed Visits: 0    Reporting Period : 3/6/23 to 4/3/23    Goals/Measure of Progress:  Short Term Goals: To be accomplished in 10 treatments:  Patient will be independent and compliant with HEP to progress toward goals and restore functional mobility. Eval Status: issued at eval  Current: Patient reports daily compliance with his HEP, in addition to walking around his home. 3/29/23, MET     Patient will improve FOTO score by 13 points to improve functional tolerance for exercise. Eval Status: FOTO 36  Current: 53   3/15/23, MET  FOTO score = an established functional score where 100 = no disability     Patient will improve pain in right hip to 4/10 at worst to improve gait tolerance, bed mobility independence and restore prior level of function. Eval Status: 8/10 at worst   Current: 0-1/10 pain over the last week during all functional activities     3/29/23, MET     Pt will have 3/5 hip flexion strength to return to goals of improved gait pattern. Eval Status:   Hip Left (1-5) Right (1-5)   Hip Flexion 4 2          Current: 5/5 bilaterally 4/3/23 MET     Pt will have painfree right hip AROM WFL to aid in functional mechanics for ambulation/ADLs.   Eval Status:   Right hip extension- flexion: -10 - 105 dgs  Current:

## 2023-04-05 ENCOUNTER — APPOINTMENT (OUTPATIENT)
Facility: HOSPITAL | Age: 68
End: 2023-04-05
Payer: MEDICARE

## 2023-04-10 ENCOUNTER — APPOINTMENT (OUTPATIENT)
Facility: HOSPITAL | Age: 68
End: 2023-04-10
Payer: MEDICARE

## 2023-04-12 ENCOUNTER — APPOINTMENT (OUTPATIENT)
Facility: HOSPITAL | Age: 68
End: 2023-04-12
Payer: MEDICARE

## 2023-04-13 ENCOUNTER — HOSPITAL ENCOUNTER (OUTPATIENT)
Facility: HOSPITAL | Age: 68
Setting detail: RECURRING SERIES
Discharge: HOME | End: 2023-04-16
Payer: MEDICARE

## 2023-04-13 PROCEDURE — 97112 NEUROMUSCULAR REEDUCATION: CPT

## 2023-04-13 PROCEDURE — 97110 THERAPEUTIC EXERCISES: CPT

## 2023-04-13 PROCEDURE — 97530 THERAPEUTIC ACTIVITIES: CPT

## 2023-04-18 ENCOUNTER — HOSPITAL ENCOUNTER (OUTPATIENT)
Facility: HOSPITAL | Age: 68
Setting detail: RECURRING SERIES
Discharge: HOME | End: 2023-04-21
Payer: MEDICARE

## 2023-04-18 PROCEDURE — 97530 THERAPEUTIC ACTIVITIES: CPT

## 2023-04-18 PROCEDURE — 97110 THERAPEUTIC EXERCISES: CPT

## 2023-04-18 PROCEDURE — 97112 NEUROMUSCULAR REEDUCATION: CPT

## 2023-04-18 NOTE — PROGRESS NOTES
PHYSICAL / OCCUPATIONAL THERAPY - DAILY TREATMENT NOTE (updated )    Patient Name: Malorie Blake. Date: 2023    : 1955  Insurance: Payor: MEDICARE / Plan: MEDICARE PART A AND B / Product Type: *No Product type* /      Patient  verified Yes     Visit #   Current / Total 3 12   Time   In / Out 3:10 4:05   Pain   In / Out 1/10 1/10   Subjective Functional Status/Changes: \"I was doing some yard work and I didn't really have any pain except when I was raking and I pulled my shoulder all the way back. \"   Changes to:  Meds, Allergies, Med Hx, Sx Hx? If yes, update Summary List no       TREATMENT AREA =  Pain in right shoulder [M25.511]    OBJECTIVE    Therapeutic Procedures: Tx Min Billable or 1:1 Min (if diff from Tx Min) Procedure, Rationale, Specifics   35 35 72042 Therapeutic Exercise (timed):  increase ROM, strength, coordination, balance, and proprioception to improve patient's ability to progress to PLOF and address remaining functional goals. (see flow sheet as applicable)     Details if applicable:       10 10 60920 Neuromuscular Re-Education (timed):  improve balance, coordination, kinesthetic sense, posture, core stability and proprioception to improve patient's ability to develop conscious control of individual muscles and awareness of position of extremities in order to progress to PLOF and address remaining functional goals. (see flow sheet as applicable)     Details if applicable:  Parascapular retraction activtion   10 10 67831 Therapeutic Activity (timed):  use of dynamic activities replicating functional movements to increase ROM, strength, coordination, balance, and proprioception in order to improve patient's ability to progress to PLOF and address remaining functional goals.   (see flow sheet as applicable)     Details if applicable:       71990 Self Care/Home Management (timed):  improve patient knowledge and understanding of pain reducing techniques, positioning, and

## 2023-04-20 ENCOUNTER — HOSPITAL ENCOUNTER (OUTPATIENT)
Facility: HOSPITAL | Age: 68
Setting detail: RECURRING SERIES
Discharge: HOME | End: 2023-04-23
Payer: MEDICARE

## 2023-04-20 PROCEDURE — 97530 THERAPEUTIC ACTIVITIES: CPT

## 2023-04-20 PROCEDURE — 97110 THERAPEUTIC EXERCISES: CPT

## 2023-04-20 PROCEDURE — 97112 NEUROMUSCULAR REEDUCATION: CPT

## 2023-04-24 ENCOUNTER — HOSPITAL ENCOUNTER (OUTPATIENT)
Facility: HOSPITAL | Age: 68
Setting detail: RECURRING SERIES
Discharge: HOME | End: 2023-04-27
Payer: MEDICARE

## 2023-04-24 PROCEDURE — 97110 THERAPEUTIC EXERCISES: CPT

## 2023-04-24 PROCEDURE — 97530 THERAPEUTIC ACTIVITIES: CPT

## 2023-04-24 PROCEDURE — 97112 NEUROMUSCULAR REEDUCATION: CPT

## 2023-04-24 NOTE — PROGRESS NOTES
PHYSICAL / OCCUPATIONAL THERAPY - DAILY TREATMENT NOTE (updated )    Patient Name: Robel Maxwell. Date: 2023    : 1955  Insurance: Payor: MEDICARE / Plan: MEDICARE PART A AND B / Product Type: *No Product type* /      Patient  verified {YES/NO:26634}     Visit #   Current / Total *** ***   Time   In / Out *** ***   Pain   In / Out *** ***   Subjective Functional Status/Changes: ***   Changes to:  Meds, Allergies, Med Hx, Sx Hx? If yes, update Summary List {YES/NO DIET:[id number]}       TREATMENT AREA =  Pain in right shoulder [M25.511]    OBJECTIVE    Therapeutic Procedures: Tx Min Billable or 1:1 Min (if diff from Tx Min) Procedure, Rationale, Specifics   ***  {[facility] Procedures:46990}     Details if applicable:       ***  {[facility] Procedures:12522}     Details if applicable:     ***  {[facility] Procedures:36249}     Details if applicable:       {[facility] Procedures:89190}     Details if applicable:       {[facility] Procedures:04713}     Details if applicable:     ***  [facility] Totals Reminder: bill using total billable min of TIMED therapeutic procedures (example: do not include dry needle or estim unattended, both untimed codes, in totals to left)  8-22 min = 1 unit; 23-37 min = 2 units; 38-52 min = 3 units; 53-67 min = 4 units; 68-82 min = 5 units   Total Total     TOTAL TREATMENT TIME:        ***     [x]  Patient Education billed concurrently with other procedures   [x] Review HEP    [] Progressed/Changed HEP, detail:    [] Other detail:       Objective Information/Functional Measures/Assessment    Patient tolerated treatment session *** today. Patient had no complaints with addition of *** to exercise program to accomplish ***. ***. Patient continues to make *** progress toward goals and would benefit from continued skilled PT intervention to address remaining deficits outlined in goals below.      Patient will continue to benefit from skilled PT / OT services

## 2023-04-24 NOTE — PROGRESS NOTES
PHYSICAL / OCCUPATIONAL THERAPY - DAILY TREATMENT NOTE (updated )    Patient Name: Safia Dial. Date: 2023    : 1955  Insurance: Payor: MEDICARE / Plan: MEDICARE PART A AND B / Product Type: *No Product type* /      Patient  verified Yes     Visit #   Current / Total 5 12   Time   In / Out 315 406   Pain   In / Out 0/10 1/10   Subjective Functional Status/Changes: Pt reported that he is now able to reach over head with out having to lower his arm back down with the other hand. Changes to:  Meds, Allergies, Med Hx, Sx Hx? If yes, update Summary List no       TREATMENT AREA =  Pain in right shoulder [M25.511]    OBJECTIVE    Therapeutic Procedures: Tx Min Billable or 1:1 Min (if diff from Tx Min) Procedure, Rationale, Specifics   15 15 34446 Therapeutic Exercise (timed):  increase ROM, strength, coordination, balance, and proprioception to improve patient's ability to progress to PLOF and address remaining functional goals. (see flow sheet as applicable)     Details if applicable:       21 15 74458 Neuromuscular Re-Education (timed):  improve balance, coordination, kinesthetic sense, posture, core stability and proprioception to improve patient's ability to develop conscious control of individual muscles and awareness of position of extremities in order to progress to PLOF and address remaining functional goals. (see flow sheet as applicable)     Details if applicable:     15 15 39312 Therapeutic Activity (timed):  use of dynamic activities replicating functional movements to increase ROM, strength, coordination, balance, and proprioception in order to improve patient's ability to progress to PLOF and address remaining functional goals.   (see flow sheet as applicable)     Details if applicable:     46 45 [facility] Totals Reminder: bill using total billable min of TIMED therapeutic procedures (example: do not include dry needle or estim unattended, both untimed codes, in totals to

## 2023-04-27 ENCOUNTER — HOSPITAL ENCOUNTER (OUTPATIENT)
Facility: HOSPITAL | Age: 68
Setting detail: RECURRING SERIES
Discharge: HOME | End: 2023-04-30
Payer: MEDICARE

## 2023-04-27 PROCEDURE — 97530 THERAPEUTIC ACTIVITIES: CPT

## 2023-04-27 PROCEDURE — 97110 THERAPEUTIC EXERCISES: CPT

## 2023-05-01 ENCOUNTER — APPOINTMENT (OUTPATIENT)
Facility: HOSPITAL | Age: 68
End: 2023-05-01
Payer: MEDICARE

## 2023-05-03 ENCOUNTER — HOSPITAL ENCOUNTER (OUTPATIENT)
Facility: HOSPITAL | Age: 68
Setting detail: RECURRING SERIES
Discharge: HOME | End: 2023-05-06
Payer: MEDICARE

## 2023-05-03 PROCEDURE — 97110 THERAPEUTIC EXERCISES: CPT

## 2023-05-03 PROCEDURE — 97162 PT EVAL MOD COMPLEX 30 MIN: CPT

## 2023-05-03 PROCEDURE — 97535 SELF CARE MNGMENT TRAINING: CPT

## 2023-05-03 NOTE — PROGRESS NOTES
[facility]  [address] Aleyda Tijerina, [address] [facility] [place]  Ph [phone]  Fx [phone]    Plan of Care/ Statement of Necessity for Physical Therapy Services      Patient name: Ayan Bower. Start of Care: 5/3/2023   Referral source: Michelle Lopez MD : 1955    Medical Diagnosis: Pain in left knee [M25.562]   Onset Date:2023    Treatment Diagnosis: M25.562  LEFT KNEE PAIN      Prior Hospitalization: see medical history Provider#: [id number]   Medications: Verified on Patient summary List   Comorbidities:  High blood pressure, Arthritis, Visual impairment, Right Hip surgery 23, Lumpectomy-5 yrs ago  Prior Level of Function: functionally independent, no AD, active lifestyle      The Plan of Care and following information is based on the information from the initial evaluation. Assessment/ key information: 79 yo male who [facility] with c/o left knee pain. Patientreported that his left knee started hurting about 3-4 months. Pt reports that pain is in lateral anterior knee. Pt notices it the most with kneeling or putting pressure on his knee. Patient reports Difficulty kneeling; difficulty with getting on and off floor. Patient demonstrates decreased ROM, decreased strength, impaired posture, impaired gait mechancis, pain and decreased functional mobility tolerance. Patient will continue to benefit from skilled PT services to modify and progress therapeutic interventions, address functional mobility deficits, address ROM deficits, address strength deficits, analyze and address soft tissue restrictions, analyze and cue movement patterns, analyze and modify body mechanics/ergonomics, assess and modify postural abnormalities, address imbalance/dizziness, and instruct in home and community integration  to attain remaining goals.   Evaluation Complexity HistoryHIGH Complexity :3+ comorbidities / personal factors will impact the outcome/ POC  ; Examination HIGH
coordination, balance, and proprioception to improve patient's ability to progress to PLOF and address remaining functional goals. (see flow sheet as applicable)     Details if applicable:     15 68 30870 Self Care/Home Management (timed):  improve patient knowledge and understanding of diagnosis/prognosis and physical therapy expectations, procedures and progression  to improve patient's ability to progress to PLOF and address remaining functional goals.   (see flow sheet as applicable)     Details if applicable:     18 22 [facility] Totals Reminder: bill using total billable min of TIMED therapeutic procedures (example: do not include dry needle or estim unattended, both untimed codes, in totals to left)  8-22 min = 1 unit; 23-37 min = 2 units; 38-52 min = 3 units; 53-67 min = 4 units; 68-82 min = 5 units   Total Total     [x]  Patient Education billed concurrently with other procedures   [x] Review HEP    [] Progressed/Changed HEP, detail:    [] Other detail:           General Evaluation    Gait: wide MARTA  Stairs:decreased eccentric control Quintin > on right  Posture: increased lumbar lordosis, slight lateral lean to Left; slight varus posture in quintin knees    Palpation/Sensation: pain with palpation to lateral knee cap    ROM:                                        AROM    Knee Left Right   Extension 7 7   Flexion 127 125      Girth:  Knee Left Right   2inches above midline of patella 50 50.5     Strength (MMT):                                            Hip Left (1-5) Right (1-5)   Hip Flexion 4 4+   Hip Extension 3+ 3+   Hip ABD 4 3+   Hip ER 4 4+   Hip IR 5 5     Knee Left (1-5) Right (1-5)   Knee Flexion 3 3+   Knee Extension 5 5   Ankle DF 4+ 5   Other       Special Tests:    Knee  Left Right   Varus Stress Test + -   Valgus Stress Test - -   Lachman's - -   Apprehension - -   Hamzah's - -   Posterior Sag - -   Patellar Grind - -     Hip Left Right   Mathieu Test - +   Soraya Montiel - -   Scours - Manav Likes + +

## 2023-05-04 ENCOUNTER — APPOINTMENT (OUTPATIENT)
Facility: HOSPITAL | Age: 68
End: 2023-05-04
Payer: MEDICARE

## 2023-05-08 ENCOUNTER — HOSPITAL ENCOUNTER (OUTPATIENT)
Facility: HOSPITAL | Age: 68
Setting detail: RECURRING SERIES
Discharge: HOME | End: 2023-05-11
Payer: MEDICARE

## 2023-05-08 ENCOUNTER — APPOINTMENT (OUTPATIENT)
Facility: HOSPITAL | Age: 68
End: 2023-05-08
Payer: MEDICARE

## 2023-05-08 PROCEDURE — 97112 NEUROMUSCULAR REEDUCATION: CPT

## 2023-05-08 PROCEDURE — 97110 THERAPEUTIC EXERCISES: CPT

## 2023-05-08 PROCEDURE — 97530 THERAPEUTIC ACTIVITIES: CPT

## 2023-05-08 NOTE — PROGRESS NOTES
PHYSICAL / OCCUPATIONAL THERAPY - DAILY TREATMENT NOTE (updated )    Patient Name: Linh Stephens. Date: 2023    : 1955  Insurance: Payor: MEDICARE / Plan: MEDICARE PART A AND B / Product Type: *No Product type* /      Patient  verified Yes     Visit #   Current / Total 2 16   Time   In / Out 1:10 1:53   Pain   In / Out 0/10 0/10   Subjective Functional Status/Changes: \"I don't have any pain in my knee during anything except for when I touch it. So like kneeling hurts too. Back in February I kneeled on the bed to take my sock off and that caused more pain in my knee than the pain in my hip ever caused me. \"   Changes to:  Meds, Allergies, Med Hx, Sx Hx? If yes, update Summary List no       TREATMENT AREA =  Pain in left knee [M25.562]    OBJECTIVE      Therapeutic Procedures: Tx Min Billable or 1:1 Min (if diff from Tx Min) Procedure, Rationale, Specifics   13 8 30867 Therapeutic Exercise (timed):  increase ROM, strength, coordination, balance, and proprioception to improve patient's ability to progress to PLOF and address remaining functional goals. (see flow sheet as applicable)     Details if applicable:       10 10 71373 Therapeutic Activity (timed):  use of dynamic activities replicating functional movements to increase ROM, strength, coordination, balance, and proprioception in order to improve patient's ability to progress to PLOF and address remaining functional goals. (see flow sheet as applicable)     Details if applicable:     20  91510 Neuromuscular Re-Education (timed):  improve balance, coordination, kinesthetic sense, posture, core stability and proprioception to improve patient's ability to develop conscious control of individual muscles and awareness of position of extremities in order to progress to PLOF and address remaining functional goals.  (see flow sheet as applicable)     Details if applicable:     37 38 [address] Reminder: bill using total billable min of

## 2023-05-10 ENCOUNTER — APPOINTMENT (OUTPATIENT)
Facility: HOSPITAL | Age: 68
End: 2023-05-10
Payer: MEDICARE

## 2023-05-11 ENCOUNTER — HOSPITAL ENCOUNTER (OUTPATIENT)
Facility: HOSPITAL | Age: 68
Setting detail: RECURRING SERIES
Discharge: HOME | End: 2023-05-14
Payer: MEDICARE

## 2023-05-11 PROCEDURE — 97112 NEUROMUSCULAR REEDUCATION: CPT

## 2023-05-11 PROCEDURE — 97110 THERAPEUTIC EXERCISES: CPT

## 2023-05-11 PROCEDURE — 97530 THERAPEUTIC ACTIVITIES: CPT

## 2023-05-15 ENCOUNTER — HOSPITAL ENCOUNTER (OUTPATIENT)
Facility: HOSPITAL | Age: 68
Setting detail: RECURRING SERIES
Discharge: HOME | End: 2023-05-18
Payer: MEDICARE

## 2023-05-15 ENCOUNTER — APPOINTMENT (OUTPATIENT)
Facility: HOSPITAL | Age: 68
End: 2023-05-15
Payer: MEDICARE

## 2023-05-15 PROCEDURE — 97112 NEUROMUSCULAR REEDUCATION: CPT

## 2023-05-15 PROCEDURE — 97110 THERAPEUTIC EXERCISES: CPT

## 2023-05-15 NOTE — PROGRESS NOTES
PHYSICAL / OCCUPATIONAL THERAPY - DAILY TREATMENT NOTE (updated )    Patient Name: Joel Adkins. Date: 5/15/2023    : 1955  Insurance: Payor: MEDICARE / Plan: MEDICARE PART A AND B / Product Type: *No Product type* /      Patient  verified Yes     Visit #   Current / Total 4 16   Time   In / Out 112 155pm   Pain   In / Out 0/10 010   Subjective Functional Status/Changes: Pain remains specific to one tender area and only to palpation. Changes to:  Meds, Allergies, Med Hx, Sx Hx? If yes, update Summary List no       TREATMENT AREA =  Pain in left knee [M25.562]    OBJECTIVE    Therapeutic Procedures: Tx Min Billable or 1:1 Min (if diff from Tx Min) Procedure, Rationale, Specifics   0  86954 Therapeutic Activity (timed):  use of dynamic activities replicating functional movements to increase ROM, strength, coordination, balance, and proprioception in order to improve patient's ability to progress to PLOF and address remaining functional goals. (see flow sheet as applicable)     Details if applicable:       33  80796 Therapeutic Exercise (timed):  increase ROM, strength, coordination, balance, and proprioception to improve patient's ability to progress to PLOF and address remaining functional goals. (see flow sheet as applicable)     Details if applicable:     10  73625 Neuromuscular Re-Education (timed):  improve balance, coordination, kinesthetic sense, posture, core stability and proprioception to improve patient's ability to develop conscious control of individual muscles and awareness of position of extremities in order to progress to PLOF and address remaining functional goals.  (see flow sheet as applicable)     Details if applicable:     37  [facility] Totals Reminder: bill using total billable min of TIMED therapeutic procedures (example: do not include dry needle or estim unattended, both untimed codes, in totals to left)  8-22 min = 1 unit; 23-37 min = 2 units; 38-52 min = 3 units; 53-67

## 2023-05-18 ENCOUNTER — APPOINTMENT (OUTPATIENT)
Facility: HOSPITAL | Age: 68
End: 2023-05-18
Payer: MEDICARE

## 2023-05-18 ENCOUNTER — HOSPITAL ENCOUNTER (OUTPATIENT)
Facility: HOSPITAL | Age: 68
Setting detail: RECURRING SERIES
Discharge: HOME | End: 2023-05-21
Payer: MEDICARE

## 2023-05-18 ENCOUNTER — TELEPHONE (OUTPATIENT)
Facility: HOSPITAL | Age: 68
End: 2023-05-18

## 2023-05-18 PROCEDURE — 97535 SELF CARE MNGMENT TRAINING: CPT

## 2023-05-18 PROCEDURE — 97530 THERAPEUTIC ACTIVITIES: CPT

## 2023-05-18 PROCEDURE — 97110 THERAPEUTIC EXERCISES: CPT

## 2023-05-23 ENCOUNTER — HOSPITAL ENCOUNTER (OUTPATIENT)
Facility: HOSPITAL | Age: 68
Setting detail: RECURRING SERIES
Discharge: HOME | End: 2023-05-26
Payer: MEDICARE

## 2023-05-23 PROCEDURE — 97530 THERAPEUTIC ACTIVITIES: CPT

## 2023-05-23 PROCEDURE — 97535 SELF CARE MNGMENT TRAINING: CPT

## 2023-05-23 NOTE — PROGRESS NOTES
Physical Therapy Discharge Instructions    [facility]  [address] Madalyn Gaona, [address] [facility]  Ph [phone]  Fx [phone]      Patient: Zeke Freeman.   : 1955      Continue Home Exercise Program 3 times per day for 3 weeks, then decrease to 1 times per week      Continue with    [x] Ice  as needed      [x] Heat           Follow up with MD:     [] Upon completion of therapy     [x] As needed      Recommendations:     [x]   Return to activity with home program    []   Return to activity with the following modifications:       []Post Rehab Program    []Join Independent aquatic program     []Return to/join local gym        Additional Comments:       Howie Vaughn, PTA 2023 4:38 PM
household chores and yard work. Eval Status: unable to put pressure on left knee               Current: Pt able to perform floor to standing and standing to floor transfer independently without UE support in clinic 5/23/23 MET      PLAN  NO Continue plan of care  []  Upgrade activities as tolerated  [x]  Discharge due to :  Majority of goals MET/Self D/C  []  Other:    My Gale, PTA    5/23/2023    8:58 AM    Future Appointments   Date Time Provider Luisa Kauffman   5/23/2023  3:50 PM [facility]   5/25/2023  2:30 PM THE [facility] PT [facility]

## 2023-05-25 ENCOUNTER — APPOINTMENT (OUTPATIENT)
Facility: HOSPITAL | Age: 68
End: 2023-05-25
Payer: MEDICARE

## 2023-10-17 ENCOUNTER — HOSPITAL ENCOUNTER (OUTPATIENT)
Facility: HOSPITAL | Age: 68
Setting detail: RECURRING SERIES
Discharge: HOME | End: 2023-10-20
Payer: MEDICARE

## 2023-10-17 PROCEDURE — 97535 SELF CARE MNGMENT TRAINING: CPT

## 2023-10-17 PROCEDURE — 97161 PT EVAL LOW COMPLEX 20 MIN: CPT

## 2023-10-17 NOTE — PROGRESS NOTES
PHYSICAL THERAPY EVALUATION / DAILY TREATMENT      Patient Name: Dev Delvalle. Date: 10/17/2023    : 1955  Insurance: Payor: MEDICARE / Plan: MEDICARE PART A AND B / Product Type: *No Product type* /      Patient  verified yes     Visit #   Current / Total 1 12   Time   In / Out 2:37 3:10   Pain   In / Out 6 6     TREATMENT AREA =  Other low back pain [M54.59]    SUBJECTIVE:  Chief Complaint/Mechanism of Injury:   Patient is a pleasant 80 y.o. male with c/o LBP that has been ongoing for about 5 years along his left SIJ region. Patient c/o increased pain when he does lateral trunk leans bilaterally, flexing forward, and when rotating bilaterally. Patient also c/o increased LBP when sitting for > 2 hours when having to walk at a slower pace to be beside his wife, but denies any issues when walking his normal pace. Patient states he tends to get pain relief when he props his foot up onto a structure when he has been standing or walking. Patient states he is able to negotiate stairs with a step-through pattern, but he does have to do a step-to pattern when he is holding something heavy and on days that his right hip is causing him pain. Patient denies any changes in his bowel or bladder, but he does report that he's has developed paresthesia of his right proximal right shin region and also along his right posterior. He describes these symptoms have been present for about a year and he is under the treatment of a physician; patient states he is scheduled to have a EMG/NCV on 10/26/23. Patient any denies any known LE weakness of either LE. He is 8 months s/p right THR with anterior approach that he states is doing well. Patient denies any known issues with his balance, including him being able to stand on one leg normally when donning/doffing his pants. Patient is retired, but he is active with home improvements and yard work.     Pain Level (out of 0-10 scale): 10/10 pain at worst, but an
patient satisfaction with overall outcome. (Shayy Sabal is an established functional score where a score of 100 = no disability)  Eval: 40     Patient will report an average daily pain of 2/10 or less during all functional activities in order to improve QOL and return to patient's PLOF. Eval: 10/10 pain at worst, but an average daily pain of 6-7/10     Patient will demonstrate functional trunk AROM that is with less than or equal to 2/10 pain in order to return to prior functional activities and mobility. Eval: Trunk AROM: flexion to mid-shins, extension to WNL though c/o left sided LBP, left sidebending to mid-thigh with c/o LBP, right sidebending to 3 inches from knee, right rotation WNL, and left rotation to 75% with c/o left sided LBP     Patient will demonstrate 5/5 strength bilaterally in order to be able to safely perform functional activities and demonstrate improved stability and strength. Eval: Grossly 5/5 bilaterally except bilateral hip flexion 4+/5 and bilateral hip abduction 4+/5     Patient will be able to perform at least 14 reps of sit <> stands during 30\" STS test to demonstrate improved LE strength and stability during this functional activity, thus reducing the patients risk of falls. Eval: 10 reps, without use of hands     Patient will be able to independently perform floor <> stands transfers while pushing from one hand on the ground to improve his ability to get up from the ground during yard work.               Eval: independently able to perform floor <> stand transfer but needs higher surface (ie: chair/table) to place his hand on to push up from    Frequency / Duration: Patient to be seen 2 times per week for 12 treatments    Patient/ Caregiver education and instruction: Diagnosis, prognosis, self care, activity modification, and other postural and lumbopelvic strengthening education      [x]  Plan of care has been reviewed with PTA    Certification Period: 10/17/23 - 1/15/24  Sean Middleton

## 2023-10-20 ENCOUNTER — HOSPITAL ENCOUNTER (OUTPATIENT)
Facility: HOSPITAL | Age: 68
Setting detail: RECURRING SERIES
Discharge: HOME | End: 2023-10-23
Payer: MEDICARE

## 2023-10-20 PROCEDURE — 97112 NEUROMUSCULAR REEDUCATION: CPT

## 2023-10-20 PROCEDURE — 97530 THERAPEUTIC ACTIVITIES: CPT

## 2023-10-20 PROCEDURE — 97110 THERAPEUTIC EXERCISES: CPT

## 2023-10-20 NOTE — PROGRESS NOTES
10/20/2023    10:12 AM    Future Appointments   Date Time Provider [address]   10/20/2023 10:30 AM Muna Newman, PT [facility] THE [facility]   10/24/2023 11:50 AM [facility]   10/26/2023 11:10 AM [facility]   10/31/2023 11:50 AM [facility]   11/3/2023 11:10 AM [facility]   [facility]   11/9/2023 11:50 AM Chantelle Bass, PT [facility] THE [facility]

## 2023-10-24 ENCOUNTER — HOSPITAL ENCOUNTER (OUTPATIENT)
Facility: HOSPITAL | Age: 68
Setting detail: RECURRING SERIES
Discharge: HOME | End: 2023-10-27
Payer: MEDICARE

## 2023-10-24 PROCEDURE — 97112 NEUROMUSCULAR REEDUCATION: CPT

## 2023-10-24 PROCEDURE — 97530 THERAPEUTIC ACTIVITIES: CPT

## 2023-10-24 NOTE — PROGRESS NOTES
PHYSICAL / OCCUPATIONAL THERAPY - DAILY TREATMENT NOTE (updated )    Patient Name: Alhaji Christian. Date: 10/24/2023    : 1955  Insurance: Payor: MEDICARE / Plan: MEDICARE PART A AND B / Product Type: *No Product type* /      Patient  verified Yes     Visit #   Current / Total 3 12   Time   In / Out 11:41 12:57   Pain   In / Out 3-4 2   Subjective Functional Status/Changes: Patient states this past Saturday he had to dig a 2 foot wide by 2.5 feet deep hole in his back yard. \"It didn't hurt, but I was tired afterwards and had to take a break. \"  Patient states he's getting ready to landscape his backyard, \"I just take my time to do it, we don't have a timeline to get it done. \"   Changes to: Allergies, Med Hx, Sx Hx?   no       TREATMENT AREA =  Other low back pain [M54.59]    OBJECTIVE    Therapeutic Procedures: Tx Min Billable or 1:1 Min (if diff from Tx Min) Procedure, Rationale, Specifics   11 6 06065 Therapeutic Exercise (timed):  increase ROM, strength, coordination, balance, and proprioception to improve patient's ability to progress to PLOF and address remaining functional goals. (see flow sheet as applicable)    Details if applicable:       40 40 87312 Therapeutic Activity (timed):  use of dynamic activities replicating functional movements to increase ROM, strength, coordination, balance, and proprioception in order to improve patient's ability to progress to PLOF and address remaining functional goals. (see flow sheet as applicable)    Details if applicable:  including floor <> stand transfers using 4 point contact on groun   25 25 48598 Neuromuscular Re-Education (timed):  improve balance, coordination, kinesthetic sense, posture, core stability and proprioception to improve patient's ability to develop conscious control of individual muscles and awareness of position of extremities in order to progress to PLOF and address remaining functional goals.  (see flow sheet as applicable)

## 2023-10-26 ENCOUNTER — HOSPITAL ENCOUNTER (OUTPATIENT)
Facility: HOSPITAL | Age: 68
Setting detail: RECURRING SERIES
Discharge: HOME | End: 2023-10-29
Payer: MEDICARE

## 2023-10-26 PROCEDURE — 97530 THERAPEUTIC ACTIVITIES: CPT

## 2023-10-26 PROCEDURE — 97112 NEUROMUSCULAR REEDUCATION: CPT

## 2023-10-26 NOTE — PROGRESS NOTES
[facility]  [address], [place], [address]  Phone [phone]  Fax [phone]    Physical Therapy Discharge Summary    Patient name: Keya Walsh. Start of Care: 10/17/2023   Referral source: Marielcristin Strickland : 1955               Medical Diagnosis: Other low back pain [M54.59]    Onset Date:               Treatment Diagnosis: M54.59  OTHER LOWER BACK PAIN     Prior Hospitalization: see medical history Provider#: [id number]   Medications: Verified on Patient summary List   Comorbidities: chronic LBP that has worsened over the last 5 years, morbid obesity, s/p right THR with anterior approach 23, left knee pain, OA, HTN  Substance Use: [] tobacco use, [] alcohol use, [] other:   Patients Self-Rated Overall Health Status: [] poor, [] fair, [x] good, [] excellent  Number of Falls Within the Past Year: [x] none, []   Prior Level of Function: active with yard work and home improvements  [x] Unrestricted with functional activities and ADL's  [x] No assistive device  [x] active lifestyle, [] moderately active lifestyle, [] sedentary lifestyle  Patients Goals:  \"I'd to have no pain in that area, but honestly I don't think the pain will go away. \"      Visits from Start of Care: 4    Missed Visits: 0    Reporting Period : 10/17/23 to 10/26/23    Assessment / Summary of Care:  Patient has completed 4 visits of skilled PT for treatment of chronic LBP. He has met all PT goals, including demonstrating functional trunk AROM and 5/5 bilateral LE strength. Patient is independent with his HEP and has returned to all his normal functional activities. Patient is discharged from skilled PT at this time, thank you for the referral to [facility]. Short Term Goals:    to be accomplished within 6 treatments:     Patient will be compliant and independent with prescribed HEP(s) in order to assist in maximizing therapeutic gains and improving overall function.   Eval:

## 2023-10-26 NOTE — PROGRESS NOTES
Physical Therapy Discharge Instructions    [facility] [address] Nirmal Chapin, [address] [facility]  Ph [phone]  Fx [phone]      Patient: Nadine Sneed. : 1955      Continue Home Exercise Program 1 times per day for 4-6 weeks, then decrease to 3 times per week      Continue with    [] Ice  as needed     [] Heat           Follow up with MD:     [] Upon completion of therapy     [x] As needed      Recommendations:     [x]   Return to activity with home program    []   Return to activity with the following modifications:       []Post Rehab Program    []Join Independent aquatic program     []Return to/join local gym      Additional Comments: Thank you for choosing [facility]!     Liliane Bass, PT 10/26/2023 9:45 AM

## 2023-10-26 NOTE — PROGRESS NOTES
PHYSICAL / OCCUPATIONAL THERAPY - DAILY TREATMENT NOTE (updated )    Patient Name: Nadine Sneed. Date: 10/26/2023    : 1955  Insurance: Payor: MEDICARE / Plan: MEDICARE PART A AND B / Product Type: *No Product type* /      Patient  verified Yes     Visit #   Current / Total 4 12   Time   In / Out 11:10 12:03   Pain   In / Out 0 0   Subjective Functional Status/Changes: Patient reports no new issues, stating he doesn't have pain anymore just \"minor discomfort along my left SIJ every now and then. \"  He reports continued compliance with his HEP. Changes to: Allergies, Med Hx, Sx Hx?   no       TREATMENT AREA =  Other low back pain [M54.59]    OBJECTIVE    Therapeutic Procedures: Tx Min Billable or 1:1 Min (if diff from Tx Min) Procedure, Rationale, Specifics   7  87303 Therapeutic Exercise (timed):  increase ROM, strength, coordination, balance, and proprioception to improve patient's ability to progress to PLOF and address remaining functional goals. (see flow sheet as applicable)    Details if applicable:       23  10035 Therapeutic Activity (timed):  use of dynamic activities replicating functional movements to increase ROM, strength, coordination, balance, and proprioception in order to improve patient's ability to progress to PLOF and address remaining functional goals. (see flow sheet as applicable)    Details if applicable:     23  08320 Neuromuscular Re-Education (timed):  improve balance, coordination, kinesthetic sense, posture, core stability and proprioception to improve patient's ability to develop conscious control of individual muscles and awareness of position of extremities in order to progress to PLOF and address remaining functional goals.  (see flow sheet as applicable)     Details if applicable:     48  [facility] Totals Reminder: bill using total billable min of TIMED therapeutic procedures (example: do not include dry needle or estim unattended, both untimed codes, in totals

## 2023-10-31 ENCOUNTER — HOSPITAL ENCOUNTER (OUTPATIENT)
Facility: HOSPITAL | Age: 68
Setting detail: RECURRING SERIES
End: 2023-10-31
Payer: MEDICARE

## 2025-04-30 ENCOUNTER — HOSPITAL ENCOUNTER (OUTPATIENT)
Facility: HOSPITAL | Age: 70
Setting detail: RECURRING SERIES
Discharge: HOME | End: 2025-05-03
Payer: MEDICARE

## 2025-04-30 PROCEDURE — 97162 PT EVAL MOD COMPLEX 30 MIN: CPT

## 2025-04-30 PROCEDURE — 97110 THERAPEUTIC EXERCISES: CPT

## 2025-04-30 NOTE — PROGRESS NOTES
PT DAILY TREATMENT NOTE/Hip/Knee/Ankle EVAL 10-18      Patient Name: Munir Dominguez Jr.    Date: 2025    : 1955  Insurance: Payor: MEDICARE / Plan: MEDICARE PART A AND B / Product Type: *No Product type* /      Patient  verified yes     Visit #   Current / Total 1 16   Time   In / Out 310 350     TREATMENT AREA =  Pain in right knee [M25.561]  Pain in left hip [M25.552]  Unilateral primary osteoarthritis, right knee [M17.11]  Low back pain, unspecified [M54.50]  Unspecified internal derangement of right knee [M23.91]        SUBJECTIVE  Pain Level (0-10 scale): 0/10  []constant []intermittent []improving []worsening []no change since onset    Any medication changes, allergies to medications, adverse drug reactions, diagnosis change, or new procedure performed?: [x] No    [] Yes   Subjective functional status/changes:     PLOF: functionally independent, no AD, active lifestyle  Limitations to PLOF: pain with walking, sitting   Mechanism of Injury: insidious onset :  2/10/2025  Current symptoms/Complaints: 0/10 at best with certain positions (flexed) ; 6/10 at worst with certain positions ; pt reports they are able to sleep through the night secondary to pain; uncomfortable with leg extended; feel less pain after the cortisone shot  Previous Treatment/Compliance: x-ray, may do a MRI , cortisone shot and fluid removed  PMHx/Surgical Hx: obesity, right THR, colon polyp removal, vitrectomy of left eye. 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                  
treatments    Patient/ Caregiver education and instruction: Diagnosis, prognosis, exercises     [x]  Plan of care has been reviewed with PTA    Insurance: Payor: MEDICARE / Plan: MEDICARE PART A AND B / Product Type: *No Product type* /      Certification Period: 4/30/2025 - 07/29/25      Brandi Walls, PT 4/30/2025 6:14 PM    No Physician signature required; Signature on POC no longer required for Medicare as of 1/1/25  [facility]  [address] [place] [address]  Ph [phone]  Fx [phone]

## 2025-05-02 ENCOUNTER — HOSPITAL ENCOUNTER (OUTPATIENT)
Facility: HOSPITAL | Age: 70
Setting detail: RECURRING SERIES
Discharge: HOME | End: 2025-05-05
Payer: MEDICARE

## 2025-05-02 PROCEDURE — 97530 THERAPEUTIC ACTIVITIES: CPT

## 2025-05-02 PROCEDURE — 97110 THERAPEUTIC EXERCISES: CPT

## 2025-05-02 PROCEDURE — 97112 NEUROMUSCULAR REEDUCATION: CPT

## 2025-05-02 NOTE — PROGRESS NOTES
PHYSICAL / OCCUPATIONAL THERAPY - DAILY TREATMENT NOTE     Patient Name: Munir Dominguez Jr.    Date: 2025    : 1955  Insurance: Payor: MEDICARE / Plan: MEDICARE PART A AND B / Product Type: *No Product type* /      Patient  verified Yes     Visit #   Current / Total 2 16   Time   In / Out 3:00 3:54   Pain   In / Out 0 2   Subjective Functional Status/Changes: Patient points to his right quad tendon, distal pole of patella, patellar tendon, and bilateral joint lines as the location of his pain.  Patient also reports that last night in the middle of the night he woke up in \"an intense cramp\" along his right hip adductor region, which he states he gets from time to time.  Patient states today he's drank 20oz of water, 12 oz of soda, and 12 oz of caffeinated   coffee.     Changes to:  Allergies, Med Hx, Sx Hx?   no       TREATMENT AREA =  Pain in right knee [M25.561]  Pain in left hip [M25.552]  Unilateral primary osteoarthritis, right knee [M17.11]  Low back pain, unspecified [M54.50]  Unspecified internal derangement of right knee [M23.91]    If an interpreting service is utilized for treatment of this patient, the contents of this document represent the material reviewed with the patient via the .     OBJECTIVE    Therapeutic Procedures:  Tx Min Billable or 1:1 Min (if diff from Tx Min) Procedure, Rationale, Specifics   10  40160 Therapeutic Exercise (timed):  increase ROM, strength, coordination, balance, and proprioception to improve patient's ability to progress to PLOF and address remaining functional goals. (see flow sheet as applicable)    Details if applicable:       16  85247 Therapeutic Activity (timed):  use of dynamic activities replicating functional movements to increase ROM, strength, coordination, balance, and proprioception in order to improve patient's ability to progress to PLOF and address remaining functional goals.  (see flow sheet as applicable)    Details if applicable:

## 2025-05-06 ENCOUNTER — HOSPITAL ENCOUNTER (OUTPATIENT)
Facility: HOSPITAL | Age: 70
Setting detail: RECURRING SERIES
Discharge: HOME | End: 2025-05-09
Payer: MEDICARE

## 2025-05-06 PROCEDURE — 97530 THERAPEUTIC ACTIVITIES: CPT

## 2025-05-06 PROCEDURE — 97110 THERAPEUTIC EXERCISES: CPT

## 2025-05-06 NOTE — PROGRESS NOTES
PHYSICAL / OCCUPATIONAL THERAPY - DAILY TREATMENT NOTE     Patient Name: Munir Dominguez Jr.    Date: 2025    : 1955  Insurance: Payor: MEDICARE / Plan: MEDICARE PART A AND B / Product Type: *No Product type* /      Patient  verified Yes     Visit #   Current / Total 3 16   Time   In / Out 9:10 9:52   Pain   In / Out 1/10 1/10   Subjective Functional Status/Changes: \"I don't have too much pain right now.\"   Changes to:  Allergies, Med Hx, Sx Hx?   no       TREATMENT AREA =  Pain in right knee [M25.561]  Pain in left hip [M25.552]  Unilateral primary osteoarthritis, right knee [M17.11]  Low back pain, unspecified [M54.50]  Unspecified internal derangement of right knee [M23.91]    If an interpreting service is utilized for treatment of this patient, the contents of this document represent the material reviewed with the patient via the .     OBJECTIVE  Therapeutic Procedures:  Tx Min Billable or 1:1 Min (if diff from Tx Min) Procedure, Rationale, Specifics   30 28 40500 Therapeutic Exercise (timed):  increase ROM, strength, coordination, balance, and proprioception to improve patient's ability to progress to PLOF and address remaining functional goals. (see flow sheet as applicable)    Details if applicable:         21558 Neuromuscular Re-Education (timed):  improve balance, coordination, kinesthetic sense, posture, core stability and proprioception to improve patient's ability to develop conscious control of individual muscles and awareness of position of extremities in order to progress to PLOF and address remaining functional goals. (see flow sheet as applicable)    Details if applicable:     12 12 50736 Therapeutic Activity (timed):  use of dynamic activities replicating functional movements to increase ROM, strength, coordination, balance, and proprioception in order to improve patient's ability to progress to PLOF and address remaining functional goals.  (see flow sheet as applicable)

## 2025-05-08 ENCOUNTER — HOSPITAL ENCOUNTER (OUTPATIENT)
Facility: HOSPITAL | Age: 70
Setting detail: RECURRING SERIES
Discharge: HOME | End: 2025-05-11
Payer: MEDICARE

## 2025-05-08 PROCEDURE — 97530 THERAPEUTIC ACTIVITIES: CPT

## 2025-05-08 PROCEDURE — 97112 NEUROMUSCULAR REEDUCATION: CPT

## 2025-05-08 NOTE — PROGRESS NOTES
PHYSICAL / OCCUPATIONAL THERAPY - DAILY TREATMENT NOTE     Patient Name: Munir Dominguez Jr.    Date: 2025    : 1955  Insurance: Payor: MEDICARE / Plan: MEDICARE PART A AND B / Product Type: *No Product type* /      Patient  verified Yes     Visit #   Current / Total 4 16   Time   In / Out 2:26 3:08   Pain   In / Out 3 2   Subjective Functional Status/Changes: \"It's like to fluid in my knee is limiting my ability and also causing pain when I bend and straighten my knee.\"  Patient states he is scheduled for a MRI next week and then goes back to MD for results the following week.   Changes to:  Allergies, Med Hx, Sx Hx?   no       TREATMENT AREA =  Pain in right knee [M25.561]  Pain in left hip [M25.552]  Unilateral primary osteoarthritis, right knee [M17.11]  Low back pain, unspecified [M54.50]  Unspecified internal derangement of right knee [M23.91]    If an interpreting service is utilized for treatment of this patient, the contents of this document represent the material reviewed with the patient via the .     OBJECTIVE    Therapeutic Procedures:  Tx Min Billable or 1:1 Min (if diff from Tx Min) Procedure, Rationale, Specifics   10  78644 Therapeutic Exercise (timed):  increase ROM, strength, coordination, balance, and proprioception to improve patient's ability to progress to PLOF and address remaining functional goals. (see flow sheet as applicable)    Details if applicable:       8  79337 Therapeutic Activity (timed):  use of dynamic activities replicating functional movements to increase ROM, strength, coordination, balance, and proprioception in order to improve patient's ability to progress to PLOF and address remaining functional goals.  (see flow sheet as applicable)    Details if applicable:     24  99639 Neuromuscular Re-Education (timed):  improve balance, coordination, kinesthetic sense, posture, core stability and proprioception to improve patient's ability to develop conscious

## 2025-05-13 ENCOUNTER — HOSPITAL ENCOUNTER (OUTPATIENT)
Facility: HOSPITAL | Age: 70
Setting detail: RECURRING SERIES
Discharge: HOME | End: 2025-05-16
Payer: MEDICARE

## 2025-05-13 ENCOUNTER — TELEPHONE (OUTPATIENT)
Facility: HOSPITAL | Age: 70
End: 2025-05-13

## 2025-05-13 PROCEDURE — 97110 THERAPEUTIC EXERCISES: CPT

## 2025-05-13 PROCEDURE — 97530 THERAPEUTIC ACTIVITIES: CPT

## 2025-05-13 PROCEDURE — 97016 VASOPNEUMATIC DEVICE THERAPY: CPT

## 2025-05-13 NOTE — TELEPHONE ENCOUNTER
LVM for pt to give us a call back if he is able to move up to 1230p, pt immediately called back & moved his appt up to 1230p

## 2025-05-13 NOTE — PROGRESS NOTES
61-80 = minimal to no limitation                     0-20 = severe limitation                                      41-60 = mild to moderate limitation  Eval: Pt did not complete the form  5/2/25: 42/80 on LEFS     Patient will improve pain in right knee  to 4/10  to improve sitting tolerance and restore prior level of function.  Eval Status: 6/10 at worst  Current: 4/10 at worst in the past week 5/13/25       Pt will have 4/5 right hip abduction strength to return to goals of walking with decreased pain.  Eval Status: 3+/5     Pt will have painfree AROM right knee extension to 0 degrees  to aid in functional mechanics for ambulation/ADLs.  Eval Status: lacks 5 degrees  Current: no change   5/8/2025      Long Term Goals: To be accomplished in 16 treatments:  Patient will score 61/80 points or higher on Lower Extremity Functional Scale (LEFS) to meet MCID and show improvement with functional activities and ADL's.  Scoring:           MCID = 9 points                                              21-40 = moderate limitation                                      61-80 = minimal to no limitation                     0-20 = severe limitation                                      41-60 = mild to moderate limitation  Eval: pt did not complete the form  5/2/25: 42/80 on LEFS     2.   Pt will have painfree right knee  AROM WFL to aid in functional mechanics for ambulation/ADLs.  Eval Status:   Knee Left Right   Extension 0 5   Flexion 120 110         3.   Pt will have 4+/5 right hip abduction, bilateral hip adduction and hip extension strength to return to goals of walking and sitting with decreased pain.  Eval Status:   Hip Left (1-5) Right (1-5)   Hip Flexion 5 5   Hip Extension 4 4   Hip ABD 5 3+   Hip ADD 4 3    Current: initiated core, gluteal, and LE strengthening today   5/2/2025, in progress      4.   Patient will improve pain in right to 2/10 at worst to improve standing and sitting

## 2025-05-15 ENCOUNTER — HOSPITAL ENCOUNTER (OUTPATIENT)
Facility: HOSPITAL | Age: 70
Setting detail: RECURRING SERIES
Discharge: HOME | End: 2025-05-18
Payer: MEDICARE

## 2025-05-15 PROCEDURE — 97110 THERAPEUTIC EXERCISES: CPT

## 2025-05-15 PROCEDURE — 97530 THERAPEUTIC ACTIVITIES: CPT

## 2025-05-15 PROCEDURE — 97112 NEUROMUSCULAR REEDUCATION: CPT

## 2025-05-15 NOTE — PROGRESS NOTES
PHYSICAL / OCCUPATIONAL THERAPY - DAILY TREATMENT NOTE     Patient Name: Munir Dominguez Jr.    Date: 5/15/2025    : 1955  Insurance: Payor: MEDICARE / Plan: MEDICARE PART A AND B / Product Type: *No Product type* /      Patient  verified Yes     Visit #   Current / Total 6 16   Time   In / Out 1:54 2:32   Pain   In / Out 2 0   Subjective Functional Status/Changes: Patient reports feeling tightness in front of knee   Changes to:  Allergies, Med Hx, Sx Hx?   no       TREATMENT AREA =  Pain in right knee [M25.561]  Pain in left hip [M25.552]  Unilateral primary osteoarthritis, right knee [M17.11]  Low back pain, unspecified [M54.50]  Unspecified internal derangement of right knee [M23.91]    If an interpreting service is utilized for treatment of this patient, the contents of this document represent the material reviewed with the patient via the .     OBJECTIVE    Therapeutic Procedures:  Tx Min Billable or 1:1 Min (if diff from Tx Min) Procedure, Rationale, Specifics   11  35533 Therapeutic Exercise (timed):  increase ROM, strength, coordination, balance, and proprioception to improve patient's ability to progress to PLOF and address remaining functional goals. (see flow sheet as applicable)    Details if applicable:       12  01761 Neuromuscular Re-Education (timed):  improve balance, coordination, kinesthetic sense, posture, core stability and proprioception to improve patient's ability to develop conscious control of individual muscles and awareness of position of extremities in order to progress to PLOF and address remaining functional goals. (see flow sheet as applicable)    Details if applicable:     15  07008 Therapeutic Activity (timed):  use of dynamic activities replicating functional movements to increase ROM, strength, coordination, balance, and proprioception in order to improve patient's ability to progress to PLOF and address remaining functional goals.  (see flow sheet as

## 2025-05-20 ENCOUNTER — HOSPITAL ENCOUNTER (OUTPATIENT)
Facility: HOSPITAL | Age: 70
Setting detail: RECURRING SERIES
Discharge: HOME | End: 2025-05-23
Payer: MEDICARE

## 2025-05-20 PROCEDURE — 97112 NEUROMUSCULAR REEDUCATION: CPT

## 2025-05-20 PROCEDURE — 97110 THERAPEUTIC EXERCISES: CPT

## 2025-05-20 PROCEDURE — 97530 THERAPEUTIC ACTIVITIES: CPT

## 2025-05-20 NOTE — PROGRESS NOTES
PHYSICAL / OCCUPATIONAL THERAPY - DAILY TREATMENT NOTE     Patient Name: Munir Dominguez Jr.    Date: 2025    : 1955  Insurance: Payor: MEDICARE / Plan: MEDICARE PART A AND B / Product Type: *No Product type* /      Patient  verified Yes     Visit #   Current / Total 7 16   Time   In / Out 1:50 2:28   Pain   In / Out 2 0   Subjective Functional Status/Changes: Patient reports minimal pain today and MRI for right knee tomorrow   Changes to:  Allergies, Med Hx, Sx Hx?   no       TREATMENT AREA =  Pain in right knee [M25.561]  Pain in left hip [M25.552]  Unilateral primary osteoarthritis, right knee [M17.11]  Low back pain, unspecified [M54.50]  Unspecified internal derangement of right knee [M23.91]    If an interpreting service is utilized for treatment of this patient, the contents of this document represent the material reviewed with the patient via the .     OBJECTIVE    Therapeutic Procedures:  Tx Min Billable or 1:1 Min (if diff from Tx Min) Procedure, Rationale, Specifics   15  34711 Therapeutic Exercise (timed):  increase ROM, strength, coordination, balance, and proprioception to improve patient's ability to progress to PLOF and address remaining functional goals. (see flow sheet as applicable)    Details if applicable:       10  73033 Neuromuscular Re-Education (timed):  improve balance, coordination, kinesthetic sense, posture, core stability and proprioception to improve patient's ability to develop conscious control of individual muscles and awareness of position of extremities in order to progress to PLOF and address remaining functional goals. (see flow sheet as applicable)    Details if applicable:     13  90919 Therapeutic Activity (timed):  use of dynamic activities replicating functional movements to increase ROM, strength, coordination, balance, and proprioception in order to improve patient's ability to progress to PLOF and address remaining functional goals.  (see flow 
an object    PLAN  Yes  Continue plan of care  []  Upgrade activities as tolerated  []  Discharge due to :  []  Other:    Samy BILL Faulkner, LESLY    5/20/2025    10:16 AM    Future Appointments   Date Time Provider Department Center   5/20/2025  1:50 PM Kaushik Samy H, PTA [facility]   5/22/2025  2:30 PM Kaushik Samy H, PTA [facility]   5/28/2025  1:10 PM Deanna Bass, PT [facility]   5/30/2025  1:50 PM Kaushik Samy H, PTA [facility]   6/3/2025  1:10 PM Samy Faulkner H, PTA [facility]   6/5/2025  1:50 PM Sherly Bal, PT [facility]   6/10/2025  1:10 PM Kaushik Samy H, PTA [facility]   6/12/2025  1:10 PM Dru Morris, PT [facility]   6/17/2025  1:10 PM Dru Morris, PT [facility]   6/20/2025  1:10 PM Samy Faulkner, PTA [facility]   6/24/2025  1:10 PM Samy Faulkner, PTA [facility]

## 2025-05-22 ENCOUNTER — HOSPITAL ENCOUNTER (OUTPATIENT)
Facility: HOSPITAL | Age: 70
Setting detail: RECURRING SERIES
Discharge: HOME | End: 2025-05-25
Payer: MEDICARE

## 2025-05-22 PROCEDURE — 97016 VASOPNEUMATIC DEVICE THERAPY: CPT

## 2025-05-22 PROCEDURE — 97112 NEUROMUSCULAR REEDUCATION: CPT

## 2025-05-22 PROCEDURE — 97110 THERAPEUTIC EXERCISES: CPT

## 2025-05-22 PROCEDURE — 97530 THERAPEUTIC ACTIVITIES: CPT

## 2025-05-22 NOTE — PROGRESS NOTES
PHYSICAL / OCCUPATIONAL THERAPY - DAILY TREATMENT NOTE     Patient Name: Munir Dominguez Jr.    Date: 2025    : 1955  Insurance: Payor: MEDICARE / Plan: MEDICARE PART A AND B / Product Type: *No Product type* /      Patient  verified Yes     Visit #   Current / Total 8 16   Time   In / Out 2:28 3:20   Pain   In / Out 3 2-3   Subjective Functional Status/Changes: The pain doesn't stop me from doing what I want to do. I just move slower since I'm not as confident in the knee   Changes to:  Allergies, Med Hx, Sx Hx?   no       TREATMENT AREA =  Pain in right knee [M25.561]  Pain in left hip [M25.552]  Unilateral primary osteoarthritis, right knee [M17.11]  Low back pain, unspecified [M54.50]  Unspecified internal derangement of right knee [M23.91]    If an interpreting service is utilized for treatment of this patient, the contents of this document represent the material reviewed with the patient via the .     OBJECTIVE    Modalities Rationale:     decrease edema, decrease inflammation, and decrease pain to improve patient's ability to progress to PLOF and address remaining functional goals.    10 min [x]  Vasopneumatic Device, press/temp: Med/Low   If using vaso (only need to measure limb vaso being performed on)      pre-treatment girth : 49 cm      post-treatment girth : 48 cm      measured at (landmark location) :  mid-patella   Skin assessment post-treatment (if applicable):    [x]  intact    []  redness- no adverse reaction                 []redness - adverse reaction:         Therapeutic Procedures:  Tx Min Billable or 1:1 Min (if diff from Tx Min) Procedure, Rationale, Specifics   18  03904 Therapeutic Exercise (timed):  increase ROM, strength, coordination, balance, and proprioception to improve patient's ability to progress to PLOF and address remaining functional goals. (see flow sheet as applicable)    Details if applicable:       12  47925 Neuromuscular Re-Education (timed):

## 2025-05-27 ENCOUNTER — TELEPHONE (OUTPATIENT)
Facility: HOSPITAL | Age: 70
End: 2025-05-27

## 2025-05-27 NOTE — TELEPHONE ENCOUNTER
Pt called to [facility] due & D/C, provider is sending him for surgery. Patient will return after surgery w/ a new referral.

## 2025-05-27 NOTE — PROGRESS NOTES
[facility] at [facility] [place] [address]  Ph [phone]  Fx [phone]    Physical Therapy Discharge Summary      Patient name: Munir Dominguez Jr. Start of Care: 2025   Referral source: Catalino Velazquez MD : 1955               Medical Diagnosis: Pain in right knee [M25.561]  Pain in left hip [M25.552]  Unilateral primary osteoarthritis, right knee [M17.11]  Low back pain, unspecified [M54.50]  Unspecified internal derangement of right knee [M23.91]    Onset Date:2/10/2025               Treatment Diagnosis: M25.561  RIGHT KNEE PAIN      Prior Hospitalization: see medical history Provider#: [id number]      Comorbidities: obesity, right THR, colon polyp removal, vitrectomy of left eye. Cataract removal left eye, HTN, pre diabetic      Prior Level of Function: functionally independent, no AD, active lifestyle     Visits from Start of Care: 8    Missed Visits: 0    Reporting Period : 2025 to 2025    Goals/Measure of Progress:  Short Term Goals: To be accomplished in 8 treatments:  Patient will be independent and compliant with HEP to progress toward goals and restore functional mobility.   Eval Status: issued at eval  Current: Pt reports performing HEP since receiving them with no difficulty 5/6/25  5/15: HEP update  2025:  Not known due to patient requesting discharge. D/C GOAL     Patient will improve score by 9 points  on Lower Extremity Functional Scale (LEFS) to meet MCID and show improvement with functional activities and ADL's.  Scoring:           MCID = 9 points                                              21-40 = moderate limitation                                      61-80 = minimal to no limitation                     0-20 = severe limitation                                      41-60 = mild to moderate limitation  Eval: Pt did not complete the form  25: 42/80 on LEFS  2025:  Not known due to patient requesting discharge. D/C GOAL

## 2025-05-28 ENCOUNTER — APPOINTMENT (OUTPATIENT)
Facility: HOSPITAL | Age: 70
End: 2025-05-28
Payer: MEDICARE

## 2025-05-30 ENCOUNTER — APPOINTMENT (OUTPATIENT)
Facility: HOSPITAL | Age: 70
End: 2025-05-30
Payer: MEDICARE

## 2025-06-10 ENCOUNTER — TRANSCRIBE ORDERS (OUTPATIENT)
Facility: HOSPITAL | Age: 70
End: 2025-06-10

## 2025-06-10 ENCOUNTER — HOSPITAL ENCOUNTER (OUTPATIENT)
Facility: HOSPITAL | Age: 70
Discharge: HOME | End: 2025-06-13
Payer: MEDICARE

## 2025-06-10 DIAGNOSIS — E13.69 OTHER SPECIFIED DIABETES MELLITUS WITH OTHER SPECIFIED COMPLICATION, UNSPECIFIED WHETHER LONG TERM INSULIN USE (HCC): ICD-10-CM

## 2025-06-10 DIAGNOSIS — I10 ESSENTIAL HYPERTENSION, MALIGNANT: ICD-10-CM

## 2025-06-10 DIAGNOSIS — Z01.812 PRE-OPERATIVE LABORATORY EXAMINATION: ICD-10-CM

## 2025-06-10 DIAGNOSIS — M25.561 RIGHT KNEE PAIN, UNSPECIFIED CHRONICITY: Primary | ICD-10-CM

## 2025-06-10 DIAGNOSIS — M17.11 OSTEOARTHRITIS OF RIGHT KNEE, UNSPECIFIED OSTEOARTHRITIS TYPE: ICD-10-CM

## 2025-06-10 DIAGNOSIS — M25.561 RIGHT KNEE PAIN, UNSPECIFIED CHRONICITY: ICD-10-CM

## 2025-06-10 LAB
ALBUMIN SERPL-MCNC: 3.9 G/DL (ref 3.4–5)
ALBUMIN/GLOB SERPL: 1.3 (ref 0.8–1.7)
ALP SERPL-CCNC: 51 U/L (ref 45–117)
ALT SERPL-CCNC: 27 U/L (ref 10–50)
ANION GAP SERPL CALC-SCNC: 11 MMOL/L (ref 3–18)
AST SERPL-CCNC: 23 U/L (ref 10–38)
BASOPHILS # BLD: 0.05 K/UL (ref 0–0.1)
BASOPHILS NFR BLD: 0.8 % (ref 0–2)
BILIRUB SERPL-MCNC: 0.3 MG/DL (ref 0.2–1)
BUN SERPL-MCNC: 18 MG/DL (ref 6–23)
BUN/CREAT SERPL: 16 (ref 12–20)
CALCIUM SERPL-MCNC: 9.2 MG/DL (ref 8.5–10.1)
CHLORIDE SERPL-SCNC: 105 MMOL/L (ref 98–107)
CO2 SERPL-SCNC: 26 MMOL/L (ref 21–32)
CREAT SERPL-MCNC: 1.11 MG/DL (ref 0.6–1.3)
DIFFERENTIAL METHOD BLD: ABNORMAL
EOSINOPHIL # BLD: 0.2 K/UL (ref 0–0.4)
EOSINOPHIL NFR BLD: 3.2 % (ref 0–5)
ERYTHROCYTE [DISTWIDTH] IN BLOOD BY AUTOMATED COUNT: 13.9 % (ref 11.6–14.5)
GLOBULIN SER CALC-MCNC: 2.9 G/DL (ref 2–4)
GLUCOSE SERPL-MCNC: 135 MG/DL (ref 74–108)
HCT VFR BLD AUTO: 38.7 % (ref 36–48)
HGB BLD-MCNC: 12.4 G/DL (ref 13–16)
IMM GRANULOCYTES # BLD AUTO: 0.03 K/UL (ref 0–0.04)
IMM GRANULOCYTES NFR BLD AUTO: 0.5 % (ref 0–0.5)
LYMPHOCYTES # BLD: 1.84 K/UL (ref 0.9–3.3)
LYMPHOCYTES NFR BLD: 29.1 % (ref 21–52)
MCH RBC QN AUTO: 30 PG (ref 24–34)
MCHC RBC AUTO-ENTMCNC: 32 G/DL (ref 31–37)
MCV RBC AUTO: 93.7 FL (ref 78–100)
MONOCYTES # BLD: 0.8 K/UL (ref 0.05–1.2)
MONOCYTES NFR BLD: 12.6 % (ref 3–10)
NEUTS SEG # BLD: 3.41 K/UL (ref 1.8–8)
NEUTS SEG NFR BLD: 53.8 % (ref 40–73)
NRBC # BLD: 0 K/UL (ref 0–0.01)
NRBC BLD-RTO: 0 PER 100 WBC
PLATELET # BLD AUTO: 213 K/UL (ref 135–420)
PMV BLD AUTO: 9.6 FL (ref 9.2–11.8)
POTASSIUM SERPL-SCNC: 4.2 MMOL/L (ref 3.5–5.5)
PROT SERPL-MCNC: 6.7 G/DL (ref 6.4–8.2)
RBC # BLD AUTO: 4.13 M/UL (ref 4.35–5.65)
SODIUM SERPL-SCNC: 142 MMOL/L (ref 136–145)
WBC # BLD AUTO: 6.3 K/UL (ref 4.6–13.2)

## 2025-06-10 PROCEDURE — 80053 COMPREHEN METABOLIC PANEL: CPT

## 2025-06-10 PROCEDURE — 36415 COLL VENOUS BLD VENIPUNCTURE: CPT

## 2025-06-10 PROCEDURE — 85025 COMPLETE CBC W/AUTO DIFF WBC: CPT

## 2025-06-11 LAB
FAX TO NUMBER: NORMAL
TEST RESULTS FAXED TO: NORMAL

## 2025-06-18 ENCOUNTER — TRANSCRIBE ORDERS (OUTPATIENT)
Facility: HOSPITAL | Age: 70
End: 2025-06-18

## 2025-06-18 ENCOUNTER — HOSPITAL ENCOUNTER (OUTPATIENT)
Facility: HOSPITAL | Age: 70
Discharge: HOME | End: 2025-06-20
Payer: MEDICARE

## 2025-06-18 DIAGNOSIS — S83.241D ACUTE MEDIAL MENISCUS TEAR OF RIGHT KNEE, SUBSEQUENT ENCOUNTER: ICD-10-CM

## 2025-06-18 DIAGNOSIS — S83.241D ACUTE MEDIAL MENISCUS TEAR OF RIGHT KNEE, SUBSEQUENT ENCOUNTER: Primary | ICD-10-CM

## 2025-06-18 PROCEDURE — 93005 ELECTROCARDIOGRAM TRACING: CPT

## 2025-06-20 LAB
EKG ATRIAL RATE: 62 BPM
EKG DIAGNOSIS: NORMAL
EKG P AXIS: 44 DEGREES
EKG P-R INTERVAL: 164 MS
EKG Q-T INTERVAL: 372 MS
EKG QRS DURATION: 82 MS
EKG QTC CALCULATION (BAZETT): 377 MS
EKG R AXIS: -10 DEGREES
EKG T AXIS: 21 DEGREES
EKG VENTRICULAR RATE: 62 BPM

## 2025-06-20 PROCEDURE — 93010 ELECTROCARDIOGRAM REPORT: CPT | Performed by: INTERNAL MEDICINE

## (undated) DEVICE — INTENDED FOR TISSUE SEPARATION, AND OTHER PROCEDURES THAT REQUIRE A SHARP SURGICAL BLADE TO PUNCTURE OR CUT.: Brand: BARD-PARKER ® CARBON RIB-BACK BLADES

## (undated) DEVICE — SOLUTION IV 250ML 0.9% SOD CHL CLR INJ FLX BG CONT PRT CLSR

## (undated) DEVICE — SYRINGE MED 30ML STD CLR PLAS LUERLOCK TIP N CTRL DISP

## (undated) DEVICE — SHEET,DRAPE,53X77,STERILE: Brand: MEDLINE

## (undated) DEVICE — PACK PROCEDURE SURG ANTR HIP

## (undated) DEVICE — GOWN,SIRUS,NONRNF,SETINSLV,2XL,18/CS: Brand: MEDLINE

## (undated) DEVICE — SOLUTION IRRIG 3000ML LAC R FLX CONT

## (undated) DEVICE — NEEDLE SPNL L3.5IN PNK HUB S STL REG WALL FIT STYL W/ QNCKE

## (undated) DEVICE — 3M™ IOBAN™ 2 ANTIMICROBIAL INCISE DRAPE 6650EZ: Brand: IOBAN™ 2

## (undated) DEVICE — SHEET,DRAPE,70X100,STERILE: Brand: MEDLINE

## (undated) DEVICE — SOL IRRIGATION INJ NACL 0.9% 500ML BTL

## (undated) DEVICE — SUTURE MCRYL + SZ 3-0 L27IN ABSRB UD PS1 L24MM 3/8 CIR REV MCP936H

## (undated) DEVICE — HOOD, PEEL-AWAY: Brand: FLYTE

## (undated) DEVICE — HANDPIECE SET WITH HIGH FLOW TIP AND SUCTION TUBE: Brand: INTERPULSE

## (undated) DEVICE — GARMENT,MEDLINE,DVT,INT,CALF,MED, GEN2: Brand: MEDLINE

## (undated) DEVICE — SUTURE VCRL + SZ 1 L36IN ABSRB UD L36MM CT-1 1/2 CIR VCP947H

## (undated) DEVICE — SOLUTION IRRIG 1500ML STRL H2O AQUALITE PLAS POUR BTL

## (undated) DEVICE — DRESSING ALG W4XL8IN AG FOAM SUPERABSORBENT SIL ANTIMIC

## (undated) DEVICE — OSCILLATING TIP SAW CARTRIDGE: Brand: PRECISION FALCON

## (undated) DEVICE — SYSTEM SKIN CLSR 22CM DERMBND PRINEO

## (undated) DEVICE — SHEET,DRAPE,40X58,STERILE: Brand: MEDLINE

## (undated) DEVICE — 3M™ BAIR PAWS FLEX™ WARMING GOWN, SMALL, 20 PER CASE 81103: Brand: BAIR PAWS™

## (undated) DEVICE — SUTURE MCRYL SZ 2-0 L36IN ABSRB UD L36MM CT-1 1/2 CIR Y945H

## (undated) DEVICE — SYRINGE MED 10ML LUERLOCK TIP W/O SFTY DISP

## (undated) DEVICE — NEEDLE SYR 18GA L1.5IN RED PLAS HUB S STL BLNT FILL W/O

## (undated) DEVICE — COAXIAL FEMORAL CANAL TIP

## (undated) DEVICE — IMPERVIOUS SURGICAL GOWN PACK, XXL POLYREINFORCED: Brand: CONVERTORS

## (undated) DEVICE — WEREWOLF FASTSEAL 6.0 HEMOSTASIS WAND: Brand: FASTSEAL 6.0 HEMOSTASIS WAND